# Patient Record
Sex: FEMALE | Race: WHITE | NOT HISPANIC OR LATINO | Employment: FULL TIME | URBAN - METROPOLITAN AREA
[De-identification: names, ages, dates, MRNs, and addresses within clinical notes are randomized per-mention and may not be internally consistent; named-entity substitution may affect disease eponyms.]

---

## 2023-06-15 DIAGNOSIS — M47.816 LUMBAR SPONDYLOSIS: ICD-10-CM

## 2023-06-15 DIAGNOSIS — I10 HYPERTENSION, ESSENTIAL: ICD-10-CM

## 2023-06-15 RX ORDER — GABAPENTIN 300 MG/1
300 CAPSULE ORAL DAILY PRN
COMMUNITY
Start: 2023-01-20 | End: 2023-06-15 | Stop reason: SDUPTHER

## 2023-06-15 RX ORDER — GABAPENTIN 300 MG/1
300 CAPSULE ORAL DAILY PRN
Qty: 90 CAPSULE | Refills: 3 | Status: SHIPPED | OUTPATIENT
Start: 2023-06-15 | End: 2024-05-24 | Stop reason: SDUPTHER

## 2023-06-15 RX ORDER — OLMESARTAN MEDOXOMIL 40 MG/1
40 TABLET ORAL DAILY
Qty: 90 TABLET | Refills: 3 | Status: SHIPPED | OUTPATIENT
Start: 2023-06-15 | End: 2023-11-06 | Stop reason: WASHOUT

## 2023-06-15 RX ORDER — OLMESARTAN MEDOXOMIL 40 MG/1
40 TABLET ORAL DAILY
COMMUNITY
Start: 2023-06-12 | End: 2023-06-15 | Stop reason: SDUPTHER

## 2023-07-13 ENCOUNTER — TELEPHONE (OUTPATIENT)
Dept: PRIMARY CARE | Facility: CLINIC | Age: 63
End: 2023-07-13
Payer: COMMERCIAL

## 2023-07-13 NOTE — TELEPHONE ENCOUNTER
Patient was not keeping track of her bp and 3 days ago she thought she would check it and it was 145/83 and 165/88 and this morning it was 185/88 she is not having any symptoms of any kind just thought since it was fluctuating and a little higher than normal she would check with you and see if there is anything else she should do?

## 2023-07-13 NOTE — TELEPHONE ENCOUNTER
Is she on benicar 40mg and acebutalol 400mg?  No PAMI abstraction has been done for her so I looked at an old note  11/8/21: our BP cuff 134/68 manual hr 72             pt cuff     138/82 hr 71  Would d/c Advil or other NSAIDs as they raise BP  Would use tylenol 1000mg every 8 hours as needed for pain  Max dose tylenol in a day is 3000mg   If no symptoms let' s see what this does for BP also report HR with BP  Schedule f/u next week with NP  Can be VV  May need to adjust meds  Max dose acetbutalol is 800mg daily so room there as long as HRI isnt too low

## 2023-07-13 NOTE — TELEPHONE ENCOUNTER
Patient states her bp has been fluctuating bp readings running in the 145's and 165's. She did have a knee replacement about 6 weeks ago and has been taking a lot of advil not sure if that is related.

## 2023-07-14 RX ORDER — ASCORBIC ACID 500 MG
1 TABLET,CHEWABLE ORAL 2 TIMES DAILY
COMMUNITY
Start: 2018-09-14

## 2023-07-14 RX ORDER — LEVOTHYROXINE SODIUM 112 UG/1
112 TABLET ORAL
COMMUNITY
Start: 2023-07-09 | End: 2024-03-19 | Stop reason: SDUPTHER

## 2023-07-14 RX ORDER — CHOLECALCIFEROL (VITAMIN D3) 25 MCG
1 TABLET ORAL DAILY
COMMUNITY
Start: 2018-08-27

## 2023-07-14 RX ORDER — ACEBUTOLOL HYDROCHLORIDE 200 MG/1
200 CAPSULE ORAL AS NEEDED
COMMUNITY
Start: 2023-06-27 | End: 2023-07-19 | Stop reason: SDUPTHER

## 2023-07-14 NOTE — TELEPHONE ENCOUNTER
Patient is taking Benicar 40mg daily, not the acebutalol daily only as needed.  Patient states the Tylenol does not work with her pain, I did PAMI her chart, patient would like an appt, please call patient to schedule

## 2023-07-18 NOTE — TELEPHONE ENCOUNTER
Patient called and left a voicemail stating to schedule for soonest appointment and leave on voicemail. This has been completed

## 2023-07-19 ENCOUNTER — TELEMEDICINE (OUTPATIENT)
Dept: PRIMARY CARE | Facility: CLINIC | Age: 63
End: 2023-07-19
Payer: COMMERCIAL

## 2023-07-19 VITALS
HEART RATE: 77 BPM | BODY MASS INDEX: 21.82 KG/M2 | DIASTOLIC BLOOD PRESSURE: 96 MMHG | WEIGHT: 144 LBS | SYSTOLIC BLOOD PRESSURE: 154 MMHG | HEIGHT: 68 IN

## 2023-07-19 DIAGNOSIS — M15.9 PRIMARY OSTEOARTHRITIS INVOLVING MULTIPLE JOINTS: ICD-10-CM

## 2023-07-19 DIAGNOSIS — I10 PRIMARY HYPERTENSION: Primary | ICD-10-CM

## 2023-07-19 DIAGNOSIS — I47.10 PAROXYSMAL SUPRAVENTRICULAR TACHYCARDIA (CMS-HCC): ICD-10-CM

## 2023-07-19 PROBLEM — M25.562 CHRONIC PAIN OF BOTH KNEES: Status: ACTIVE | Noted: 2021-05-11

## 2023-07-19 PROBLEM — M51.9 LUMBAR DISC DISEASE: Status: ACTIVE | Noted: 2023-07-19

## 2023-07-19 PROBLEM — R92.8 ABNORMAL MAMMOGRAM: Status: ACTIVE | Noted: 2019-01-08

## 2023-07-19 PROBLEM — D50.9 IRON DEFICIENCY ANEMIA, UNSPECIFIED: Status: ACTIVE | Noted: 2023-07-19

## 2023-07-19 PROBLEM — R05.3 CHRONIC COUGH: Status: ACTIVE | Noted: 2023-07-19

## 2023-07-19 PROBLEM — M15.0 PRIMARY OSTEOARTHRITIS INVOLVING MULTIPLE JOINTS: Status: ACTIVE | Noted: 2023-07-19

## 2023-07-19 PROBLEM — M54.16 LUMBAR RADICULOPATHY: Status: ACTIVE | Noted: 2020-10-20

## 2023-07-19 PROBLEM — Z00.00 ROUTINE HEALTH MAINTENANCE: Status: ACTIVE | Noted: 2023-07-19

## 2023-07-19 PROBLEM — E03.9 HYPOTHYROIDISM, ADULT: Status: ACTIVE | Noted: 2023-07-19

## 2023-07-19 PROBLEM — D64.9 ANEMIA: Status: ACTIVE | Noted: 2023-07-19

## 2023-07-19 PROBLEM — F51.04 CHRONIC INSOMNIA: Status: ACTIVE | Noted: 2023-07-19

## 2023-07-19 PROBLEM — R00.2 PALPITATIONS: Status: ACTIVE | Noted: 2019-01-21

## 2023-07-19 PROBLEM — Z96.651 S/P TOTAL KNEE ARTHROPLASTY, RIGHT: Status: ACTIVE | Noted: 2023-05-19

## 2023-07-19 PROBLEM — G89.29 CHRONIC PAIN OF BOTH KNEES: Status: ACTIVE | Noted: 2021-05-11

## 2023-07-19 PROBLEM — J30.9 ALLERGIC RHINITIS: Status: ACTIVE | Noted: 2023-07-19

## 2023-07-19 PROBLEM — M25.561 CHRONIC PAIN OF BOTH KNEES: Status: ACTIVE | Noted: 2021-05-11

## 2023-07-19 PROBLEM — R94.31 ABNORMAL EKG: Status: ACTIVE | Noted: 2023-07-19

## 2023-07-19 PROBLEM — E53.8 VITAMIN B12 DEFICIENCY: Status: ACTIVE | Noted: 2023-07-19

## 2023-07-19 PROBLEM — K64.0 GRADE I HEMORRHOIDS: Status: ACTIVE | Noted: 2023-07-19

## 2023-07-19 PROBLEM — Z85.850 HISTORY OF THYROID CANCER: Status: ACTIVE | Noted: 2023-07-19

## 2023-07-19 PROBLEM — E55.9 VITAMIN D DEFICIENCY: Status: ACTIVE | Noted: 2023-07-19

## 2023-07-19 PROCEDURE — 99213 OFFICE O/P EST LOW 20 MIN: CPT | Performed by: NURSE PRACTITIONER

## 2023-07-19 RX ORDER — ACEBUTOLOL HYDROCHLORIDE 200 MG/1
200 CAPSULE ORAL AS NEEDED
Qty: 10 CAPSULE | Refills: 0 | Status: SHIPPED | OUTPATIENT
Start: 2023-07-19

## 2023-07-19 ASSESSMENT — PATIENT HEALTH QUESTIONNAIRE - PHQ9
2. FEELING DOWN, DEPRESSED OR HOPELESS: NOT AT ALL
SUM OF ALL RESPONSES TO PHQ9 QUESTIONS 1 AND 2: 0
1. LITTLE INTEREST OR PLEASURE IN DOING THINGS: NOT AT ALL

## 2023-07-19 NOTE — PROGRESS NOTES
An interactive audio and video telecommunication system which permits real time communications between the patient (at the originating site) and provider (at the distant site) was utilized to provide this telehealth service.  Verbal consent was requested and obtained from the patient for this telehealth visit.       Subjective   Patient ID: Isabel Burroughs is a 62 y.o. female who presents for Medication review.    TKR 3 mo ago  Arthritis  Stopped advil on 7/14  Pain is all over joints  Not just knee  Its the thigh, the hip, the leg  Wants to see rheum  Since stopping advil  160/90s  118/75  122/74  142/93  HR 60, 64, 76, 70  Pain worsening HTN?  Advil worsening HTN?  Her research shows the possibility of amlodipine and celebrex        Review of Systems  ROS completely negative except what was mentioned in the HPI.  Problem List, surgical, social, and family histories which were reviewed and updated as necessary within the EMR. I also personally reviewed the notes, labs, and imaging that pertained to what was documented or discussed in the HPI.      Objective   Physical Exam  Vitals and nursing note reviewed.   Constitutional:       Appearance: Normal appearance.   HENT:      Head: Normocephalic and atraumatic.      Right Ear: External ear normal.      Left Ear: External ear normal.      Nose: Nose normal.      Mouth/Throat:      Mouth: Mucous membranes are moist.   Eyes:      Extraocular Movements: Extraocular movements intact.      Conjunctiva/sclera: Conjunctivae normal.   Pulmonary:      Effort: Pulmonary effort is normal.   Musculoskeletal:      Cervical back: Normal range of motion and neck supple.   Neurological:      General: No focal deficit present.      Mental Status: She is alert and oriented to person, place, and time. Mental status is at baseline.   Psychiatric:         Mood and Affect: Mood normal.         Behavior: Behavior normal.         Thought Content: Thought content normal.         Judgment:  "Judgment normal.         BP (!) 154/96   Pulse 77   Ht 1.727 m (5' 8\")   Wt 65.3 kg (144 lb)   BMI 21.90 kg/m²     Assessment/Plan    Problem List Items Addressed This Visit       Paroxysmal supraventricular tachycardia (CMS/HCC)    Overview     Holter 2013: IMPRESSIONS AND FINDINGS:       Sinus rhythm / tachycardia.       Maximum heart rate was 130 bpm at 06:37.       Minimum heart rate was 64 bpm at 09:35.       The average heart rate was 70 bpm.       Rare ventricular ectopic singles.       Rare supraventricular ectopic singles, couplets,       bigeminy, and runs. Th fastest run 4 beats 156 bpm       at13:08 and longest run 13 beats 136 bpm at 17:51.       Patient's diary at 10:58 on 9/11/13 did correlate with       ecg signal with ventricular ectopic singles.       Holter 8/22: NSR, average HR 69 (min 52, max 137)      AFIB/AFLUTTER 0%      SVE: BUrden 0.28% max count/24 hr was 275      SVT: 25 events. longest 14 beats; fastest 170      Pause: ) events      AVB; 0%      PVC 0.01% max/24h= 11      VT: 0 events      Pt recorded no events      s/p ER consult 11/22: 2 soft indications for treatment of her ectopy, namely to decrease      her symptoms and to decrease the risk of future atrial fibrillation.      acetabutol prn prescribed         Current Assessment & Plan     Since thyroid medications adjusted, has not had any episodes  Has BB to use prn, has not needed  Followed by EP cards         Relevant Medications    acebutolol (Sectral) 200 mg capsule    Primary hypertension - Primary    Overview     11/8/21: our BP cuff  134/68  manual  hr 72      pt cuff          138/82 hr 71  12/21 Renal Artery US (-)  On Benicar daily, Acebutatolol prn  Adverse effects w/ACEi, stopped 1/23         Current Assessment & Plan     Discussed HTN tx & OA tx  Will discuss plan with PCP         Relevant Medications    acebutolol (Sectral) 200 mg capsule    Primary osteoarthritis involving multiple joints    Current Assessment & " Plan     Advil contributing to HTN?  She would like to start celebrex  Discussed it can still increase BP, may need to begin HTN tx and titrate up  Will discuss with PCP

## 2023-07-19 NOTE — ASSESSMENT & PLAN NOTE
Since thyroid medications adjusted, has not had any episodes  Has BB to use prn, has not needed  Followed by EP cards

## 2023-07-19 NOTE — ASSESSMENT & PLAN NOTE
Advil contributing to HTN?  She would like to start celebrex  Discussed it can still increase BP, may need to begin HTN tx and titrate up  Will discuss with PCP

## 2023-07-21 DIAGNOSIS — I10 PRIMARY HYPERTENSION: Primary | ICD-10-CM

## 2023-07-21 DIAGNOSIS — M15.9 PRIMARY OSTEOARTHRITIS INVOLVING MULTIPLE JOINTS: ICD-10-CM

## 2023-07-21 RX ORDER — AMLODIPINE BESYLATE 5 MG/1
5 TABLET ORAL DAILY
Qty: 30 TABLET | Refills: 5 | Status: SHIPPED | OUTPATIENT
Start: 2023-07-21 | End: 2023-08-13

## 2023-07-21 RX ORDER — CELECOXIB 200 MG/1
200 CAPSULE ORAL DAILY
Qty: 90 CAPSULE | Refills: 3 | Status: SHIPPED | OUTPATIENT
Start: 2023-07-21 | End: 2023-09-12 | Stop reason: ALTCHOICE

## 2023-07-24 PROBLEM — Z86.19 HISTORY OF HELICOBACTER PYLORI INFECTION: Status: ACTIVE | Noted: 2023-07-24

## 2023-08-12 DIAGNOSIS — I10 PRIMARY HYPERTENSION: ICD-10-CM

## 2023-08-13 RX ORDER — AMLODIPINE BESYLATE 5 MG/1
5 TABLET ORAL DAILY
Qty: 30 TABLET | Refills: 5 | Status: SHIPPED | OUTPATIENT
Start: 2023-08-13 | End: 2024-01-27

## 2023-09-12 RX ORDER — IBUPROFEN 200 MG
2 TABLET ORAL EVERY 6 HOURS PRN
COMMUNITY

## 2023-10-14 ENCOUNTER — LAB (OUTPATIENT)
Dept: LAB | Facility: LAB | Age: 63
End: 2023-10-14
Payer: COMMERCIAL

## 2023-10-14 DIAGNOSIS — I10 PRIMARY HYPERTENSION: ICD-10-CM

## 2023-10-14 LAB
ANION GAP SERPL CALC-SCNC: 10 MMOL/L (ref 10–20)
BUN SERPL-MCNC: 24 MG/DL (ref 6–23)
CALCIUM SERPL-MCNC: 9.4 MG/DL (ref 8.6–10.3)
CHLORIDE SERPL-SCNC: 104 MMOL/L (ref 98–107)
CO2 SERPL-SCNC: 31 MMOL/L (ref 21–32)
CREAT SERPL-MCNC: 0.78 MG/DL (ref 0.5–1.05)
GFR SERPL CREATININE-BSD FRML MDRD: 86 ML/MIN/1.73M*2
GLUCOSE SERPL-MCNC: 84 MG/DL (ref 74–99)
POTASSIUM SERPL-SCNC: 4.4 MMOL/L (ref 3.5–5.3)
SODIUM SERPL-SCNC: 141 MMOL/L (ref 136–145)

## 2023-10-14 PROCEDURE — 80048 BASIC METABOLIC PNL TOTAL CA: CPT

## 2023-10-14 PROCEDURE — 36415 COLL VENOUS BLD VENIPUNCTURE: CPT

## 2023-11-06 ENCOUNTER — TELEMEDICINE (OUTPATIENT)
Dept: PRIMARY CARE | Facility: CLINIC | Age: 63
End: 2023-11-06
Payer: COMMERCIAL

## 2023-11-06 DIAGNOSIS — F41.8 ANXIETY ABOUT HEALTH: Primary | ICD-10-CM

## 2023-11-06 PROBLEM — R92.8 ABNORMAL MAMMOGRAM: Status: RESOLVED | Noted: 2019-01-08 | Resolved: 2023-11-06

## 2023-11-06 PROBLEM — R00.2 PALPITATIONS: Status: RESOLVED | Noted: 2019-01-21 | Resolved: 2023-11-06

## 2023-11-06 PROBLEM — R94.31 ABNORMAL EKG: Status: RESOLVED | Noted: 2023-07-19 | Resolved: 2023-11-06

## 2023-11-06 PROBLEM — Z96.651 S/P TOTAL KNEE ARTHROPLASTY, RIGHT: Status: RESOLVED | Noted: 2023-05-19 | Resolved: 2023-11-06

## 2023-11-06 PROCEDURE — 99213 OFFICE O/P EST LOW 20 MIN: CPT | Performed by: NURSE PRACTITIONER

## 2023-11-06 RX ORDER — ESCITALOPRAM OXALATE 10 MG/1
10 TABLET ORAL DAILY
Qty: 90 TABLET | Refills: 3 | Status: SHIPPED | OUTPATIENT
Start: 2023-11-06 | End: 2024-05-24 | Stop reason: WASHOUT

## 2023-11-06 RX ORDER — ALPRAZOLAM 0.25 MG/1
0.25 TABLET ORAL 3 TIMES DAILY PRN
Qty: 21 TABLET | Refills: 0 | Status: SHIPPED | OUTPATIENT
Start: 2023-11-06 | End: 2024-05-24 | Stop reason: WASHOUT

## 2023-11-06 ASSESSMENT — ANXIETY QUESTIONNAIRES
5. BEING SO RESTLESS THAT IT IS HARD TO SIT STILL: SEVERAL DAYS
2. NOT BEING ABLE TO STOP OR CONTROL WORRYING: SEVERAL DAYS
1. FEELING NERVOUS, ANXIOUS, OR ON EDGE: SEVERAL DAYS
3. WORRYING TOO MUCH ABOUT DIFFERENT THINGS: SEVERAL DAYS
4. TROUBLE RELAXING: SEVERAL DAYS
6. BECOMING EASILY ANNOYED OR IRRITABLE: NOT AT ALL
7. FEELING AFRAID AS IF SOMETHING AWFUL MIGHT HAPPEN: SEVERAL DAYS
IF YOU CHECKED OFF ANY PROBLEMS ON THIS QUESTIONNAIRE, HOW DIFFICULT HAVE THESE PROBLEMS MADE IT FOR YOU TO DO YOUR WORK, TAKE CARE OF THINGS AT HOME, OR GET ALONG WITH OTHER PEOPLE: SOMEWHAT DIFFICULT
GAD7 TOTAL SCORE: 6

## 2023-11-06 ASSESSMENT — PATIENT HEALTH QUESTIONNAIRE - PHQ9
1. LITTLE INTEREST OR PLEASURE IN DOING THINGS: SEVERAL DAYS
SUM OF ALL RESPONSES TO PHQ QUESTIONS 1-9: 6
7. TROUBLE CONCENTRATING ON THINGS, SUCH AS READING THE NEWSPAPER OR WATCHING TELEVISION: SEVERAL DAYS
9. THOUGHTS THAT YOU WOULD BE BETTER OFF DEAD, OR OF HURTING YOURSELF: NOT AT ALL
5. POOR APPETITE OR OVEREATING: SEVERAL DAYS
3. TROUBLE FALLING OR STAYING ASLEEP: SEVERAL DAYS
SUM OF ALL RESPONSES TO PHQ9 QUESTIONS 1 & 2: 2
6. FEELING BAD ABOUT YOURSELF - OR THAT YOU ARE A FAILURE OR HAVE LET YOURSELF OR YOUR FAMILY DOWN: NOT AT ALL
2. FEELING DOWN, DEPRESSED OR HOPELESS: SEVERAL DAYS
8. MOVING OR SPEAKING SO SLOWLY THAT OTHER PEOPLE COULD HAVE NOTICED. OR THE OPPOSITE, BEING SO FIGETY OR RESTLESS THAT YOU HAVE BEEN MOVING AROUND A LOT MORE THAN USUAL: NOT AT ALL
10. IF YOU CHECKED OFF ANY PROBLEMS, HOW DIFFICULT HAVE THESE PROBLEMS MADE IT FOR YOU TO DO YOUR WORK, TAKE CARE OF THINGS AT HOME, OR GET ALONG WITH OTHER PEOPLE: SOMEWHAT DIFFICULT
4. FEELING TIRED OR HAVING LITTLE ENERGY: SEVERAL DAYS

## 2023-11-06 NOTE — PROGRESS NOTES
Due to technical difficulties on the parties end, an interactive audio only telecommunication system which permits real time communications between the patient (at the originating site) and provider (at the distant site) was utilized to provide this telehealth service.  Verbal consent was requested and obtained from the patient for this telehealth visit.     Subjective   Patient ID: Isabel Burroughs is a 62 y.o. female who presents for Anxiety.    Anxiety  Was in a study for [facility]  Theres possible areas of cancer in liver or bile duct  Did CT  Friday results, referral to hepatology  See a mass  Next step is to hear from liver team for biopsy of liver  Having heightened anxiety  Would like sometime to immediate bring down anxiety  Not opposed to long term medication for anxiety        Review of Systems  ROS completely negative except what was mentioned in the HPI.  Problem List, surgical, social, and family histories which were reviewed and updated as necessary within the EMR. I also personally reviewed the notes, labs, and imaging that pertained to what was documented or discussed in the HPI.    Objective   Physical Exam  Pulmonary:      Effort: Pulmonary effort is normal.      Comments: Speaking in complete sentences  Neurological:      Mental Status: She is oriented to person, place, and time. Mental status is at baseline.   Psychiatric:         Thought Content: Thought content normal.         Judgment: Judgment normal.       There were no vitals taken for this visit.    Assessment/Plan    Problem List Items Addressed This Visit       Anxiety about health - Primary    Overview     11/6/23 - PHQ = 6; MIGUEL = 6  Started lexapro, prn xanax         Relevant Medications    escitalopram (Lexapro) 10 mg tablet    ALPRAZolam (Xanax) 0.25 mg tablet

## 2023-11-07 PROBLEM — F41.8 ANXIETY ABOUT HEALTH: Status: ACTIVE | Noted: 2023-11-07

## 2023-11-29 PROBLEM — C22.1 CHOLANGIOCARCINOMA (MULTI): Status: ACTIVE | Noted: 2023-11-29

## 2023-12-18 ENCOUNTER — APPOINTMENT (OUTPATIENT)
Dept: PRIMARY CARE | Facility: CLINIC | Age: 63
End: 2023-12-18
Payer: COMMERCIAL

## 2024-01-02 ENCOUNTER — DOCUMENTATION (OUTPATIENT)
Dept: PRIMARY CARE | Facility: CLINIC | Age: 64
End: 2024-01-02
Payer: COMMERCIAL

## 2024-01-03 ENCOUNTER — PATIENT OUTREACH (OUTPATIENT)
Dept: CARE COORDINATION | Facility: CLINIC | Age: 64
End: 2024-01-03
Payer: COMMERCIAL

## 2024-01-03 RX ORDER — METHOCARBAMOL 500 MG/1
250 TABLET, FILM COATED ORAL 2 TIMES DAILY PRN
COMMUNITY
End: 2024-05-24 | Stop reason: WASHOUT

## 2024-01-03 RX ORDER — OXYCODONE HYDROCHLORIDE 5 MG/1
2.5 TABLET ORAL EVERY 6 HOURS PRN
COMMUNITY
End: 2024-03-19 | Stop reason: ALTCHOICE

## 2024-01-03 RX ORDER — ONDANSETRON 4 MG/1
4 TABLET, FILM COATED ORAL EVERY 8 HOURS PRN
COMMUNITY
End: 2024-03-19 | Stop reason: ALTCHOICE

## 2024-01-03 RX ORDER — ENOXAPARIN SODIUM 100 MG/ML
40 INJECTION SUBCUTANEOUS DAILY
COMMUNITY
End: 2024-03-19 | Stop reason: ALTCHOICE

## 2024-01-03 NOTE — PROGRESS NOTES
Discharge Facility:[facility]  Discharge Diagnosis:Cholangiocarcinoma   Admission Date:12/15/2023  Discharge Date: 12/30/2023    PCP Appointment Date:Declined, Will fu with Surgeon, Hem Onc  Specialist Appointment Date: 1/11/2024 Hem/Onc [facility] Encounter and Summary:  not available at this time   See discharge assessment below for further details  Engagement  Call Start Time: 1500 (1/3/2024  3:03 PM)    Medications  Medications reviewed with patient/caregiver?: -- (Oxycodone IR 5 mg 1/2 q 6 hours prn pain, Zofran 4mg one q 8 hours prn, Robaxin 500 mg 1/2 bid, Lovenox 40mg/0.4 ml 0.4 ml sq qd x 28 days.) (1/3/2024  3:03 PM)  Does the patient have all medications ordered at discharge?: Yes (1/3/2024  3:03 PM)  Care Management Interventions: No intervention needed (1/3/2024  3:03 PM)  Is the patient taking all medications as directed (includes completed medication regime)?: Yes (1/3/2024  3:03 PM)    Appointments  Does the patient have a primary care provider?: Yes (1/3/2024  3:03 PM)  Care Management Interventions: -- (Declines PCP follow up, will fu with Surgeon, Hem/Onc) (1/3/2024  3:03 PM)  Has the patient kept scheduled appointments due by today?: Yes (1/3/2024  3:03 PM)    Self Management  What is the home health agency?: -- ([facility] has been ordered, she has not been contacted yet) (1/3/2024  3:03 PM)  Has home health visited the patient within 72 hours of discharge?: Call prior to 72 hours (1/3/2024  3:03 PM)    Patient Teaching  Does the patient have access to their discharge instructions?: Yes (1/3/2024  3:03 PM)  What is the patient's perception of their health status since discharge?: Improving (1/3/2024  3:03 PM)  Is the patient/caregiver able to teach back the hierarchy of who to call/visit for symptoms/problems? PCP, Specialist, Home Health nurse, Urgent Care, ED, 911: Yes (1/3/2024  3:03 PM)    Wrap Up  Wrap Up Additional Comments: -- (Isabel states the incision site is doing well, she has 2 drains  which are clear. She will fu with Surgeon and Hem/Onc) (1/3/2024  3:03 PM)

## 2024-01-27 DIAGNOSIS — I10 PRIMARY HYPERTENSION: ICD-10-CM

## 2024-01-27 RX ORDER — AMLODIPINE BESYLATE 5 MG/1
5 TABLET ORAL DAILY
Qty: 30 TABLET | Refills: 5 | Status: SHIPPED | OUTPATIENT
Start: 2024-01-27 | End: 2024-03-19 | Stop reason: WASHOUT

## 2024-02-16 ENCOUNTER — PATIENT OUTREACH (OUTPATIENT)
Dept: CARE COORDINATION | Facility: CLINIC | Age: 64
End: 2024-02-16
Payer: COMMERCIAL

## 2024-02-16 NOTE — PROGRESS NOTES
Discharge Facility:[facility]  Discharge Diagnosis:Pleural effusion associated with hepatic disorder   Admission Date:2/10/2024  Discharge Date: 2/15/2024    PCP Appointment Date:TBD  Specialist Appointment Date: 2/22/2024 Surgeon/[facility]  2/23/2024 Thoracic Surgeon/[facility] Encounter and Summary:  not available at this time

## 2024-02-19 ENCOUNTER — TELEPHONE (OUTPATIENT)
Dept: PRIMARY CARE | Facility: CLINIC | Age: 64
End: 2024-02-19
Payer: COMMERCIAL

## 2024-02-19 NOTE — TELEPHONE ENCOUNTER
----- Message from Vashti Carpenter MD sent at 2/16/2024  5:36 PM EST -----  Regarding: FW: TCM No Answer  Can offer f/up appt with NP if she wishes  ----- Message -----  From: Dannielle Hernandez MA  Sent: 2/16/2024   5:33 PM EST  To: #  Subject: TCM No Answer                                    This patient was discharged from: [facility]  Discharge diagnosis:  Pleural Effusion associated w/hepatic disorder    Date of discharge: 2/15/2024       No PCP appointments available within 14 days of discharge. Unable to contact patient despite outreach attempts x 2 made.  Please reach out to patient and schedule an appointment by: 2/29/2024    Thank You!

## 2024-02-24 PROBLEM — J90 PLEURAL EFFUSION: Status: ACTIVE | Noted: 2024-02-24

## 2024-02-29 DIAGNOSIS — E03.9 HYPOTHYROIDISM, ADULT: ICD-10-CM

## 2024-03-01 ENCOUNTER — PATIENT OUTREACH (OUTPATIENT)
Dept: PRIMARY CARE | Facility: CLINIC | Age: 64
End: 2024-03-01
Payer: COMMERCIAL

## 2024-03-01 NOTE — PROGRESS NOTES
Unable to reach patient for call back after patient's follow up appointment with PCP.   LUCIUSM with call back number for patient to call if needed   If no voicemail available call attempts x 2 were made to contact the patient to assist with any questions or concerns patient may have.

## 2024-03-14 ENCOUNTER — LAB (OUTPATIENT)
Dept: LAB | Facility: LAB | Age: 64
End: 2024-03-14
Payer: COMMERCIAL

## 2024-03-14 DIAGNOSIS — E03.9 HYPOTHYROIDISM, ADULT: ICD-10-CM

## 2024-03-14 DIAGNOSIS — R53.83 FATIGUE, UNSPECIFIED TYPE: ICD-10-CM

## 2024-03-14 LAB
T4 FREE SERPL-MCNC: 1.02 NG/DL (ref 0.78–1.48)
TSH SERPL-ACNC: 9.12 MIU/L (ref 0.44–3.98)

## 2024-03-14 PROCEDURE — 82306 VITAMIN D 25 HYDROXY: CPT

## 2024-03-14 PROCEDURE — 83540 ASSAY OF IRON: CPT

## 2024-03-14 PROCEDURE — 84443 ASSAY THYROID STIM HORMONE: CPT

## 2024-03-14 PROCEDURE — 83550 IRON BINDING TEST: CPT

## 2024-03-14 PROCEDURE — 84439 ASSAY OF FREE THYROXINE: CPT

## 2024-03-14 PROCEDURE — 84100 ASSAY OF PHOSPHORUS: CPT

## 2024-03-14 PROCEDURE — 84132 ASSAY OF SERUM POTASSIUM: CPT

## 2024-03-14 PROCEDURE — 36415 COLL VENOUS BLD VENIPUNCTURE: CPT

## 2024-03-15 ENCOUNTER — TELEPHONE (OUTPATIENT)
Dept: PRIMARY CARE | Facility: CLINIC | Age: 64
End: 2024-03-15
Payer: COMMERCIAL

## 2024-03-15 NOTE — TELEPHONE ENCOUNTER
----- Message from aVshti Carpenter MD sent at 3/15/2024  9:42 AM EDT -----  Schedule and we can address thyroid treatment then  Can do VV earlier than CPE if need be for that  ----- Message -----  From: Bernie Diehl MA  Sent: 3/15/2024   9:24 AM EDT  To: Vashti Carpenter MD    Relayed to pt verbally understood and acknowledged   Pt does have sx of fatigue, dry skin always cold so that has never changed   Will have clerical reach out to schedule CPE     (Overdue for CPE schedule  Allow 40min so have her come earlier to be roomed if given a 30min slot)

## 2024-03-15 NOTE — TELEPHONE ENCOUNTER
I am only likely going to increase it by a little bit so she can take an extra 1/2 pill once a week

## 2024-03-15 NOTE — TELEPHONE ENCOUNTER
Patient scheduled virtual appt on 3/19 for thyroid.  Questioning if there should be any changes in her medication before her appt.

## 2024-03-19 ENCOUNTER — TELEMEDICINE (OUTPATIENT)
Dept: PRIMARY CARE | Facility: CLINIC | Age: 64
End: 2024-03-19
Payer: COMMERCIAL

## 2024-03-19 VITALS — DIASTOLIC BLOOD PRESSURE: 70 MMHG | SYSTOLIC BLOOD PRESSURE: 124 MMHG | BODY MASS INDEX: 18.55 KG/M2 | WEIGHT: 122 LBS

## 2024-03-19 DIAGNOSIS — F41.8 ANXIETY ABOUT HEALTH: ICD-10-CM

## 2024-03-19 DIAGNOSIS — Z85.850 HISTORY OF THYROID CANCER: ICD-10-CM

## 2024-03-19 DIAGNOSIS — D64.9 ANEMIA, UNSPECIFIED TYPE: ICD-10-CM

## 2024-03-19 DIAGNOSIS — R53.83 FATIGUE, UNSPECIFIED TYPE: Primary | ICD-10-CM

## 2024-03-19 DIAGNOSIS — D50.9 IRON DEFICIENCY ANEMIA, UNSPECIFIED IRON DEFICIENCY ANEMIA TYPE: ICD-10-CM

## 2024-03-19 DIAGNOSIS — E53.8 VITAMIN B12 DEFICIENCY: ICD-10-CM

## 2024-03-19 DIAGNOSIS — E03.9 HYPOTHYROIDISM, ADULT: ICD-10-CM

## 2024-03-19 DIAGNOSIS — E55.9 VITAMIN D DEFICIENCY: ICD-10-CM

## 2024-03-19 DIAGNOSIS — C22.1 CHOLANGIOCARCINOMA (MULTI): ICD-10-CM

## 2024-03-19 PROBLEM — G89.29 CHRONIC PAIN OF BOTH KNEES: Status: RESOLVED | Noted: 2021-05-11 | Resolved: 2024-03-19

## 2024-03-19 PROBLEM — M25.561 CHRONIC PAIN OF BOTH KNEES: Status: RESOLVED | Noted: 2021-05-11 | Resolved: 2024-03-19

## 2024-03-19 PROBLEM — M25.562 CHRONIC PAIN OF BOTH KNEES: Status: RESOLVED | Noted: 2021-05-11 | Resolved: 2024-03-19

## 2024-03-19 LAB
25(OH)D3 SERPL-MCNC: 50 NG/ML (ref 30–100)
IRON SATN MFR SERPL: 14 % (ref 25–45)
IRON SERPL-MCNC: 29 UG/DL (ref 35–150)
PHOSPHATE SERPL-MCNC: 3.4 MG/DL (ref 2.5–4.9)
POTASSIUM SERPL-SCNC: 4.7 MMOL/L (ref 3.5–5.3)
TIBC SERPL-MCNC: 213 UG/DL (ref 240–445)
UIBC SERPL-MCNC: 184 UG/DL (ref 110–370)

## 2024-03-19 PROCEDURE — 3074F SYST BP LT 130 MM HG: CPT | Performed by: INTERNAL MEDICINE

## 2024-03-19 PROCEDURE — 3078F DIAST BP <80 MM HG: CPT | Performed by: INTERNAL MEDICINE

## 2024-03-19 PROCEDURE — 1036F TOBACCO NON-USER: CPT | Performed by: INTERNAL MEDICINE

## 2024-03-19 PROCEDURE — 99215 OFFICE O/P EST HI 40 MIN: CPT | Performed by: INTERNAL MEDICINE

## 2024-03-19 RX ORDER — LEVOTHYROXINE SODIUM 112 UG/1
TABLET ORAL
Qty: 104 TABLET | Refills: 3
Start: 2024-03-19 | End: 2024-04-09 | Stop reason: SDUPTHER

## 2024-03-19 NOTE — PROGRESS NOTES
An interactive telecommunication system which permits real time communications between the patient (at the originating site) and provider (at the distant site) was utilized to provide this telehealth service.    Verbal consent was requested and obtained from the patient for this telehealth visit.    We have confirmed the patient wishes to see me, Dr. Vashti Carpenter, a board certified Internal Medicine physician with an active [place] medical license as well as verified the patient's identity and physical location in [place].  Audio and Visual both.    Chief Complaint/HPI:  Chief Complaint   Patient presents with    Thyroid Problem     Pt does have sx of fatigue, dry skin always has been cold so that has never changed  Had surgery a couple months ago -Liver resection    Recent labs done that showed elevated TSH    Had multiple complications following her liver cancer surgery, surgical resection which took place on Dec 15   Intrahepatic cholangiocarcinoma (HCC)  Staging form: Intrahepatic Bile Duct, AJCC 8th Edition  - Pathologic stage from 12/15/2023: Stage IB (pT1b, pN0, cM0)  An MRI of the liver followed and CT scan of the chest showed no evidence of metastatic disease.  On abdominal imaging there is a suspicious area of possible peritoneal disease in the right lower quadrant   Had a bile duct leak, post-op biloma       Has recurrent pleaural effusion, unclear cause  Pleurex placed with IR 2/11. Went to US and underwent a paracentesis 2/15 where 1.4 L of fluid was removed.   Hasn't started the chemo: per chart notes it will beg capecitabine 2000/1500 mg 2 weeks on/1 week off. for 8 cycles. But given DPYD intermediate metabolizer, will start with 1000 mg BID.     She feels she has turned the corner after her bile drain was removed    She called on and was tired   On gabapentin at night  On Lexapro  Off BP meds  Had black BM that was black  Did take pepto bismol  Last CBC reviewed 2/15/24  Hemoglobin  11.5 - 15.5 g/dL 7.7  Low    Hematocrit  36.0 - 46.0 % 23.3 Low        Labs reviewed:  Component      Latest Ref Rng 3/14/2024   Thyroid Stimulating Hormone      0.44 - 3.98 mIU/L 9.12 (H)    Thyroxine, Free      0.78 - 1.48 ng/dL 1.02         ASSESSMENT/PLAN  Problem List Items Addressed This Visit          Medium    Anemia    Overview     Hematology consulted in January 2020.      Normocytic anemia for many years and has been relatively stable.        No other cytopenias noted and is noted to have a normal differential of her white blood      count.       Prior iron, folate studies have been normal. Hgb identification was normal      Haptoglobin normal, no evidence of hemolysis.      Given her Hgb has been stable for years and normal today, this could be her      normal      If Hgb starts to drift down at any point in the future or less than 10, that is when I      would evaluate with a bone marrow biopsy       On B12 supplement (level was low at 236 in 10/20), parenteral x 3months, switched to      oral 1/21      Hb 11.3 in 2015      Monitor         Relevant Orders    CBC and Auto Differential    Anxiety about health    Overview     11/6/23 - PHQ = 6; MIGUEL = 6  Started lexapro, prn xanax         Current Assessment & Plan     Try and wean down and off         Cholangiocarcinoma (CMS/HCC)    Overview     Stage IB (pT1b, pN0, cM0)   Incidentally found 5.8cm intrahepatic cholangiocarcinoma, discovered during workup for elevated tumor markers after enrolling in a clinical trial at [facility].   Had positive screen noted on her Grail test for: Liver, Bile duct cancer and Pancreas, Gallbladder cancer  The mass involves/abuts the intrahepatic IVC, main PV, as well as the middle and left hepatic veins.   CT liver 11/23: 5.7 CM LOBULAR ENHANCING MASS IN THE CENTRAL ASPECT OF THE LIVER WITH   SUSPECTED VASCULAR INVOLVEMENT AND ABDOMINAL LYMPHADENOPATHY.  PRIMARY   HEPATIC LESION CONSIDERED MOST LIKELY WITH LEADING DIFFERENTIAL   CONSIDERATION OF  CHOLANGIOCARCINOMA.   SUBTLE HAZY MESENTERIC DENSITY IN THE PERIPHERY OF THE ABDOMEN SUGGESTIVE   OF DEVELOPING CARCINOMATOSIS.   MRI liver 11/23: Central hepatic mass, presumably a cholangiocarcinoma.  This has vascular and biliary abutment   Liver biopsy 11/23: Adenocarcinoma involving liver parenchyma. . The overall immunophenotype would support a metastatic adenocarcinoma of pancreaticobiliary origin or a primary intrahepatic cholangiocarcinoma   12/23 PET SCAN: 5.7 cm central hepatic moderately avid cholangiocarcinoma  *  No FDG avid lymphadenopathy   S/p diagnostic laparoscopy converted to exploratory laparotomy, extended left hepatectomy, intraoperative ultrasound, and hilar lympadenectomy (12/15/2023)   Recommending capecitabine 2000/1500 mg 2 weeks on/1 week off. for 8 cycles,  given DPYD intermediate metabolizer, will start with 1000 mg BID   Complicated by recurrent post-op pleural effusion  s/p IR placement of R pleurx catheter 2/11/24.   Sees Oncology [facility]           History of thyroid cancer    Overview     Occult papillary Carcinoma;mutifocal micropapillary thyroid cancer.   Follicular Adenoma;mutifocal micropapillary thyroid cancer.  Keep TSH low, no need for complete suppression though         Hypothyroidism, adult    Overview     Goal TSH low, but doesnt have to be undetectable      On synthroid         Current Assessment & Plan     Incraese dose by one extra pill/week  Labs in 6 weeks         Relevant Medications    levothyroxine (Synthroid, Levoxyl) 112 mcg tablet    Other Relevant Orders    Tsh With Reflex To Free T4 If Abnormal    T3, free    T3    Iron deficiency anemia, unspecified    Overview     Last Assessment & Plan: Formatting of this note might be different from the original. Assessment: chronic and stable, CBC 11/11/2022: 11.9 -pre op CBC pending         Vitamin B12 deficiency    Overview     B12 236 in 10/20      Started B12 supplements, parenteral x 3months then changed to oral in  1/21      Monitor         Vitamin D deficiency    Overview     Goal 40-50      On supplement      Monitor          Other Visit Diagnoses       Fatigue, unspecified type    -  Primary    Suspect due to anemia  Check additional labs  Wean off SSRI if able  Increase thyroid med dose    Relevant Orders    Phosphorus    Vitamin B12    Vitamin D 25-Hydroxy,Total (for eval of Vitamin D levels)    Hepatic function panel    Iron and TIBC    Potassium          Time spent prepping/preparing for visit as well as pre/post-visit charting: 10 minutes  Time spent directly with Patient: 30 minutes  Total Time: 40 minutes      ALLERGIES  Albuterol, Amitriptyline, and Levofloxacin    MEDICATIONS  Current Outpatient Medications   Medication Instructions    acebutolol (SECTRAL) 200 mg, oral, As needed, Takes only when having rapid heart beat per cardiology    ALPRAZolam (XANAX) 0.25 mg, oral, 3 times daily PRN    ascorbic acid (Vitamin C) 500 mg chewable tablet 1 tablet, oral, 2 times daily    cholecalciferol (Vitamin D-3) 25 MCG (1000 UT) tablet 1 tablet, oral, Daily    escitalopram (LEXAPRO) 10 mg, oral, Daily    gabapentin (NEURONTIN) 300 mg, oral, Daily PRN    ibuprofen (AdviL) 200 mg tablet 2 tablets, oral, Every 6 hours PRN    levothyroxine (Synthroid, Levoxyl) 112 mcg tablet Take one pill daily and an extra pill once weekly    methocarbamol (ROBAXIN) 250 mg, oral, 2 times daily PRN        ROS otherwise negative aside from what was mentioned above in HPI.    PHYSICAL EXAM  Gen: Alert, NAD

## 2024-04-09 DIAGNOSIS — E03.9 HYPOTHYROIDISM, ADULT: ICD-10-CM

## 2024-04-09 RX ORDER — LEVOTHYROXINE SODIUM 112 UG/1
TABLET ORAL
Qty: 104 TABLET | Refills: 3 | Status: SHIPPED | OUTPATIENT
Start: 2024-04-09

## 2024-05-08 ENCOUNTER — LAB (OUTPATIENT)
Dept: LAB | Facility: LAB | Age: 64
End: 2024-05-08
Payer: COMMERCIAL

## 2024-05-08 ENCOUNTER — HOSPITAL ENCOUNTER (OUTPATIENT)
Dept: RADIOLOGY | Facility: CLINIC | Age: 64
Discharge: HOME | End: 2024-05-08
Payer: COMMERCIAL

## 2024-05-08 VITALS — BODY MASS INDEX: 19.4 KG/M2 | HEIGHT: 68 IN | WEIGHT: 128 LBS

## 2024-05-08 DIAGNOSIS — Z12.31 ENCOUNTER FOR SCREENING MAMMOGRAM FOR BREAST CANCER: ICD-10-CM

## 2024-05-08 DIAGNOSIS — E03.9 HYPOTHYROIDISM, ADULT: ICD-10-CM

## 2024-05-08 DIAGNOSIS — R53.83 FATIGUE, UNSPECIFIED TYPE: ICD-10-CM

## 2024-05-08 DIAGNOSIS — D64.9 ANEMIA, UNSPECIFIED TYPE: ICD-10-CM

## 2024-05-08 LAB
ALBUMIN SERPL BCP-MCNC: 3 G/DL (ref 3.4–5)
ALP SERPL-CCNC: 191 U/L (ref 33–136)
ALT SERPL W P-5'-P-CCNC: 54 U/L (ref 7–45)
AST SERPL W P-5'-P-CCNC: 43 U/L (ref 9–39)
BASOPHILS # BLD MANUAL: 0 X10*3/UL (ref 0–0.1)
BASOPHILS NFR BLD MANUAL: 0 %
BILIRUB DIRECT SERPL-MCNC: 0.3 MG/DL (ref 0–0.3)
BILIRUB SERPL-MCNC: 1.4 MG/DL (ref 0–1.2)
EOSINOPHIL # BLD MANUAL: 0.15 X10*3/UL (ref 0–0.7)
EOSINOPHIL NFR BLD MANUAL: 1.8 %
ERYTHROCYTE [DISTWIDTH] IN BLOOD BY AUTOMATED COUNT: ABNORMAL %
FERRITIN SERPL-MCNC: 157 NG/ML (ref 8–150)
HCT VFR BLD AUTO: 26.5 % (ref 36–46)
HGB BLD-MCNC: 8.7 G/DL (ref 12–16)
IMM GRANULOCYTES # BLD AUTO: 0.03 X10*3/UL (ref 0–0.7)
IMM GRANULOCYTES NFR BLD AUTO: 0.3 % (ref 0–0.9)
IRON SATN MFR SERPL: 11 % (ref 25–45)
IRON SERPL-MCNC: 32 UG/DL (ref 35–150)
LYMPHOCYTES # BLD MANUAL: 1.2 X10*3/UL (ref 1.2–4.8)
LYMPHOCYTES NFR BLD MANUAL: 13.9 %
MCH RBC QN AUTO: 33.5 PG (ref 26–34)
MCHC RBC AUTO-ENTMCNC: 32.8 G/DL (ref 32–36)
MCV RBC AUTO: 102 FL (ref 80–100)
MONOCYTES # BLD MANUAL: 0.45 X10*3/UL (ref 0.1–1)
MONOCYTES NFR BLD MANUAL: 5.2 %
NEUTS SEG # BLD MANUAL: 6.8 X10*3/UL (ref 1.2–7)
NEUTS SEG NFR BLD MANUAL: 79.1 %
NRBC BLD-RTO: 0 /100 WBCS (ref 0–0)
PLATELET # BLD AUTO: 337 X10*3/UL (ref 150–450)
PLATELET CLUMP BLD QL SMEAR: PRESENT
PROT SERPL-MCNC: 5.1 G/DL (ref 6.4–8.2)
RBC # BLD AUTO: 2.6 X10*6/UL (ref 4–5.2)
RBC MORPH BLD: NORMAL
T3 SERPL-MCNC: 77 NG/DL (ref 60–200)
T3FREE SERPL-MCNC: 2.2 PG/ML (ref 2.3–4.2)
T4 FREE SERPL-MCNC: 1.31 NG/DL (ref 0.78–1.48)
TIBC SERPL-MCNC: 298 UG/DL (ref 240–445)
TOTAL CELLS COUNTED BLD: 115
TSH SERPL-ACNC: 5.49 MIU/L (ref 0.44–3.98)
UIBC SERPL-MCNC: 266 UG/DL (ref 110–370)
VIT B12 SERPL-MCNC: 435 PG/ML (ref 211–911)
WBC # BLD AUTO: 8.6 X10*3/UL (ref 4.4–11.3)

## 2024-05-08 PROCEDURE — 84480 ASSAY TRIIODOTHYRONINE (T3): CPT

## 2024-05-08 PROCEDURE — 82728 ASSAY OF FERRITIN: CPT

## 2024-05-08 PROCEDURE — 82607 VITAMIN B-12: CPT

## 2024-05-08 PROCEDURE — 77067 SCR MAMMO BI INCL CAD: CPT

## 2024-05-08 PROCEDURE — 80076 HEPATIC FUNCTION PANEL: CPT

## 2024-05-08 PROCEDURE — 84443 ASSAY THYROID STIM HORMONE: CPT

## 2024-05-08 PROCEDURE — 85027 COMPLETE CBC AUTOMATED: CPT

## 2024-05-08 PROCEDURE — 84481 FREE ASSAY (FT-3): CPT

## 2024-05-08 PROCEDURE — 84439 ASSAY OF FREE THYROXINE: CPT

## 2024-05-08 PROCEDURE — 83550 IRON BINDING TEST: CPT

## 2024-05-08 PROCEDURE — 83540 ASSAY OF IRON: CPT

## 2024-05-08 PROCEDURE — 36415 COLL VENOUS BLD VENIPUNCTURE: CPT

## 2024-05-08 PROCEDURE — 85007 BL SMEAR W/DIFF WBC COUNT: CPT

## 2024-05-08 PROCEDURE — 77063 BREAST TOMOSYNTHESIS BI: CPT | Performed by: RADIOLOGY

## 2024-05-08 PROCEDURE — 77067 SCR MAMMO BI INCL CAD: CPT | Performed by: RADIOLOGY

## 2024-05-09 ENCOUNTER — HOSPITAL ENCOUNTER (OUTPATIENT)
Dept: RADIOLOGY | Facility: EXTERNAL LOCATION | Age: 64
Discharge: HOME | End: 2024-05-09
Payer: COMMERCIAL

## 2024-05-09 DIAGNOSIS — Z85.850 HISTORY OF THYROID CANCER: ICD-10-CM

## 2024-05-09 DIAGNOSIS — E03.9 HYPOTHYROIDISM, ADULT: ICD-10-CM

## 2024-05-22 ASSESSMENT — PROMIS GLOBAL HEALTH SCALE
EMOTIONAL_PROBLEMS: SOMETIMES
RATE_AVERAGE_PAIN: 2
CARRYOUT_PHYSICAL_ACTIVITIES: A LITTLE
RATE_QUALITY_OF_LIFE: FAIR
RATE_SOCIAL_SATISFACTION: FAIR
RATE_GENERAL_HEALTH: GOOD
RATE_MENTAL_HEALTH: GOOD
RATE_AVERAGE_FATIGUE: MODERATE
RATE_PHYSICAL_HEALTH: FAIR
CARRYOUT_SOCIAL_ACTIVITIES: GOOD

## 2024-05-23 RX ORDER — ACETAMINOPHEN 500 MG
500 TABLET ORAL 4 TIMES DAILY
COMMUNITY
Start: 2023-12-30

## 2024-05-23 RX ORDER — CYCLOBENZAPRINE HCL 5 MG
5 TABLET ORAL DAILY PRN
COMMUNITY
Start: 2024-02-23 | End: 2024-05-24 | Stop reason: WASHOUT

## 2024-05-23 RX ORDER — FUROSEMIDE 20 MG/1
20 TABLET ORAL DAILY PRN
COMMUNITY
Start: 2024-05-02

## 2024-05-23 RX ORDER — AMMONIUM LACTATE 12 G/100G
1 LOTION TOPICAL DAILY PRN
COMMUNITY
Start: 2023-10-19 | End: 2024-05-24 | Stop reason: WASHOUT

## 2024-05-23 RX ORDER — FAMOTIDINE 20 MG/1
20 TABLET, FILM COATED ORAL DAILY
COMMUNITY
Start: 2023-12-30 | End: 2024-05-24 | Stop reason: WASHOUT

## 2024-05-23 RX ORDER — AMOXICILLIN 500 MG/1
2000 CAPSULE ORAL
COMMUNITY
Start: 2023-01-09

## 2024-05-23 RX ORDER — LIDOCAINE 560 MG/1
1 PATCH PERCUTANEOUS; TOPICAL; TRANSDERMAL DAILY
COMMUNITY
Start: 2024-01-23 | End: 2024-05-24 | Stop reason: WASHOUT

## 2024-05-23 RX ORDER — ONDANSETRON HYDROCHLORIDE 8 MG/1
8 TABLET, FILM COATED ORAL EVERY 8 HOURS PRN
COMMUNITY
Start: 2024-03-21

## 2024-05-24 ENCOUNTER — OFFICE VISIT (OUTPATIENT)
Dept: PRIMARY CARE | Facility: CLINIC | Age: 64
End: 2024-05-24
Payer: COMMERCIAL

## 2024-05-24 VITALS
DIASTOLIC BLOOD PRESSURE: 71 MMHG | TEMPERATURE: 96.6 F | BODY MASS INDEX: 18.66 KG/M2 | HEIGHT: 68 IN | WEIGHT: 123.13 LBS | SYSTOLIC BLOOD PRESSURE: 115 MMHG | OXYGEN SATURATION: 100 % | HEART RATE: 84 BPM

## 2024-05-24 DIAGNOSIS — J90 PLEURAL EFFUSION: ICD-10-CM

## 2024-05-24 DIAGNOSIS — G62.0 NEUROPATHY DUE TO CHEMOTHERAPEUTIC DRUG (MULTI): ICD-10-CM

## 2024-05-24 DIAGNOSIS — D64.9 ANEMIA, UNSPECIFIED TYPE: ICD-10-CM

## 2024-05-24 DIAGNOSIS — M47.816 LUMBAR SPONDYLOSIS: ICD-10-CM

## 2024-05-24 DIAGNOSIS — Z12.31 ENCOUNTER FOR SCREENING MAMMOGRAM FOR BREAST CANCER: ICD-10-CM

## 2024-05-24 DIAGNOSIS — I47.10 PAROXYSMAL SUPRAVENTRICULAR TACHYCARDIA (CMS-HCC): ICD-10-CM

## 2024-05-24 DIAGNOSIS — E53.8 VITAMIN B12 DEFICIENCY: ICD-10-CM

## 2024-05-24 DIAGNOSIS — F51.04 CHRONIC INSOMNIA: ICD-10-CM

## 2024-05-24 DIAGNOSIS — Z78.0 MENOPAUSE: ICD-10-CM

## 2024-05-24 DIAGNOSIS — Z85.850 HISTORY OF THYROID CANCER: ICD-10-CM

## 2024-05-24 DIAGNOSIS — E55.9 VITAMIN D DEFICIENCY: ICD-10-CM

## 2024-05-24 DIAGNOSIS — E03.9 HYPOTHYROIDISM, ADULT: ICD-10-CM

## 2024-05-24 DIAGNOSIS — Z00.00 ROUTINE HEALTH MAINTENANCE: Primary | ICD-10-CM

## 2024-05-24 DIAGNOSIS — T45.1X5A NEUROPATHY DUE TO CHEMOTHERAPEUTIC DRUG (MULTI): ICD-10-CM

## 2024-05-24 DIAGNOSIS — C22.1 CHOLANGIOCARCINOMA (MULTI): ICD-10-CM

## 2024-05-24 PROBLEM — M54.16 LUMBAR RADICULOPATHY: Status: RESOLVED | Noted: 2020-10-20 | Resolved: 2024-05-24

## 2024-05-24 PROBLEM — D50.9 IRON DEFICIENCY ANEMIA, UNSPECIFIED: Status: RESOLVED | Noted: 2023-07-19 | Resolved: 2024-05-24

## 2024-05-24 PROBLEM — Z86.79 HISTORY OF HYPERTENSION: Status: ACTIVE | Noted: 2023-07-19

## 2024-05-24 PROCEDURE — 1036F TOBACCO NON-USER: CPT | Performed by: INTERNAL MEDICINE

## 2024-05-24 PROCEDURE — 99396 PREV VISIT EST AGE 40-64: CPT | Performed by: INTERNAL MEDICINE

## 2024-05-24 RX ORDER — ALPRAZOLAM 0.25 MG/1
0.25 TABLET ORAL 3 TIMES DAILY PRN
COMMUNITY

## 2024-05-24 RX ORDER — FERROUS SULFATE 325(65) MG
325 TABLET, DELAYED RELEASE (ENTERIC COATED) ORAL 3 TIMES WEEKLY
Start: 2024-05-24 | End: 2025-05-24

## 2024-05-24 RX ORDER — GABAPENTIN 300 MG/1
300 CAPSULE ORAL NIGHTLY
Qty: 90 CAPSULE | Refills: 3 | Status: SHIPPED | OUTPATIENT
Start: 2024-05-24

## 2024-05-24 RX ORDER — GABAPENTIN 100 MG/1
100 CAPSULE ORAL 3 TIMES DAILY PRN
Qty: 270 CAPSULE | Refills: 0 | Status: SHIPPED | OUTPATIENT
Start: 2024-05-24 | End: 2024-08-22

## 2024-05-24 RX ORDER — ALCOHOL 2.38 KG/3.79L
1 GEL TOPICAL DAILY
Qty: 90 CAPSULE | Refills: 3 | Status: SHIPPED | OUTPATIENT
Start: 2024-05-24

## 2024-05-24 ASSESSMENT — PATIENT HEALTH QUESTIONNAIRE - PHQ9
SUM OF ALL RESPONSES TO PHQ9 QUESTIONS 1 AND 2: 0
1. LITTLE INTEREST OR PLEASURE IN DOING THINGS: NOT AT ALL
2. FEELING DOWN, DEPRESSED OR HOPELESS: NOT AT ALL

## 2024-05-24 NOTE — ASSESSMENT & PLAN NOTE
Annual Wellness exam completed   Preventive Health History reviewed  Ordered:   Labs    Mammogram   BMD   PCV 20 indicated given chemo - she will discuss with oncology

## 2024-05-24 NOTE — PROGRESS NOTES
ANNUAL CPE VISIT  Chief Complaint   Patient presents with    Annual Exam     HPI: Mild neuropathy symptoms, fatgue and diarrhea from her chemo  Plan through Fall  CT abd: 1.  No new mass or lymphadenopathy   2.  Decreased size of a perihepatic fluid collection   3.  Decreased ascites   4.  Decreased size of a right pleural effusion     Has Plerex tube in  To stay in until 100cc/week  Is decreasing as is her ascites  Hurts her    Labs reviewed:  Component      Latest Ref Rng 5/8/2024   % Saturation      25 - 45 % 11 (L)    Started on iron    Component      Latest Ref Rng 5/8/2024   Neutrophils %, Manual      40.0 - 80.0 % 79.1    Lymphocytes %, Manual      13.0 - 44.0 % 13.9    Monocytes %, Manual      2.0 - 10.0 % 5.2    Eosinophils %, Manual      0.0 - 6.0 % 1.8    Basophils %, Manual      0.0 - 2.0 % 0.0    Seg Neutrophils Absolute, Manual      1.20 - 7.00 x10*3/uL 6.80    Lymphocytes Absolute, Manual      1.20 - 4.80 x10*3/uL 1.20    Monocytes Absolute, Manual      0.10 - 1.00 x10*3/uL 0.45    Eosinophils Absolute, Manual      0.00 - 0.70 x10*3/uL 0.15    Basophils Absolute, Manual      0.00 - 0.10 x10*3/uL 0.00    Total Cells Counted 115    RBC Morphology No significant RBC morphology present    Clumped Platelets Present    LEUKOCYTES (10*3/UL) IN BLOOD BY AUTOMATED COUNT, [place]      4.4 - 11.3 x10*3/uL 8.6    nRBC      0.0 - 0.0 /100 WBCs 0.0    ERYTHROCYTES (10*6/UL) IN BLOOD BY AUTOMATED COUNT, [place]      4.00 - 5.20 x10*6/uL 2.60 (L)    HEMOGLOBIN      12.0 - 16.0 g/dL 8.7 (L)    HEMATOCRIT      36.0 - 46.0 % 26.5 (L)    MCV      80 - 100 fL 102 (H)    MCH      26.0 - 34.0 pg 33.5    MCHC      32.0 - 36.0 g/dL 32.8    RED CELL DISTRIBUTION WIDTH --    PLATELETS (10*3/UL) IN BLOOD AUTOMATED COUNT, [place]      150 - 450 x10*3/uL 337    Immature Granulocytes %, Automated      0.0 - 0.9 % 0.3    Immature Granulocytes Absolute, Automated      0.00 - 0.70 x10*3/uL 0.03    Albumin      3.4 - 5.0 g/dL 3.0  (L)    Bilirubin Total      0.0 - 1.2 mg/dL 1.4 (H)    Bilirubin, Direct      0.0 - 0.3 mg/dL 0.3    Alkaline Phosphatase      33 - 136 U/L 191 (H)    ALT      7 - 45 U/L 54 (H)    AST      9 - 39 U/L 43 (H)    Total Protein      6.4 - 8.2 g/dL 5.1 (L)    Vitamin B12      211 - 911 pg/mL 435    Thyroid Stimulating Hormone      0.44 - 3.98 mIU/L 5.49 (H)    Triiodothyronine, Free      2.3 - 4.2 pg/mL 2.2 (L)    Triiodothyronine      60 - 200 ng/dL 77         Assessment and Plan:  Problem List Items Addressed This Visit          High    Routine health maintenance - Primary    Overview         Influenza 8/24/2015, 2020, 10/25/21, 10/1/22. 9/21/23      Pfizer COVID 19 vaccine 3/6/21, 3/27/21, 12/15/21         RSV 12/1/23      TD Adult4/27/2004, 10/13/2020      Tdap (Age 7+)2/4/2011, 12/1/23       Zostavax 8/24/2015      Shingrix: 10/2020: 12/2020       PAP/HPV 2014 (-), 9/14/18 (-); 11/15/22 (-)      Hep C Ab (-) 5/28/15      Mamm 11/8/18; 12/19/19, 12/24/20, 12/29/22; 5/10/2024      BMD 11/8/18, 2/8/21      Cscope 2015 (7yrs); 11/22/22 (7-10 yrs)         Current Assessment & Plan     Annual Wellness exam completed   Preventive Health History reviewed  Ordered:   Labs    Mammogram   BMD   PCV 20 indicated given chemo - she will discuss with oncology             Relevant Orders    Comprehensive Metabolic Panel    CBC and Auto Differential    Lipid Panel    Urinalysis with Reflex Culture and Microscopic       Medium    Anemia    Overview     Hematology consulted in January 2020.  Normocytic anemia for many years and has been relatively stable.    On B12 supplement (level was low at 236 in 10/20), parenteral x 3months, switched to oral 1/21  Hb 11.3 in 2015  In 5/24 due to capecitabine 1000 mg BID and also iron deficient  Started on iron         Relevant Medications    ferrous sulfate 325 (65 Fe) MG EC tablet    Other Relevant Orders    Folate    Iron and TIBC    Ferritin    Iron and TIBC    Ferritin    Cholangiocarcinoma  (Multi)    Overview     Centrally located intrahepatic cholangiocarinoma. Resected.  Stage IB (pT1b, pN0, cM0)   DPYD and UGT1A1: Intermediate metabolizer for both.   GENOMIC PROFILE (CARIS): FGFR2 mutation, KMT2C, MYLES. TMB 2 mut/Mb.  Incidentally found 5.8cm intrahepatic cholangiocarcinoma, discovered by clinical trial at [facility]. (Had positive screen noted on her Grail test for: Liver, Bile duct cancer and Pancreas, Gallbladder cancer)  Liver biopsy 11/23: Adenocarcinoma involving liver parenchyma.   12/23 PET SCAN: 5.7 cm central hepatic moderately avid cholangiocarcinoma, No FDG avid lymphadenopathy , no evidence of metastatic disease.   S/p diagnostic laparoscopy converted to exploratory laparotomy, extended left hepatectomy, intraoperative ultrasound, and hilar lympadenectomy (12/15/2023)   Complicated by recurrent post-op pleural effusion  s/p IR placement of R pleurx catheter 2/11/24.   Started adjuvant capecitabine 1000 mg BID 3/24, plan thru Fall 2024  Sees Oncology [facility]           Chronic insomnia    Overview     On Gabapentin         Encounter for screening mammogram for breast cancer    Relevant Orders    BI mammo bilateral screening tomosynthesis    History of thyroid cancer    Overview     Occult papillary Carcinoma;mutifocal micropapillary thyroid cancer.   Follicular Adenoma;mutifocal micropapillary thyroid cancer.  Keep TSH low, no need for complete suppression though         Hypothyroidism, adult    Overview     Goal TSH low, but doesnt have to be undetectable      On synthroid         Relevant Orders    TSH with reflex to Free T4 if abnormal    Lumbar spondylosis    Overview     MRI 8/20: Degenerative grade 1 retrolisthesis of L5 over S1 with exaggeration of the overall lumbar lordosis. Incidental S2/3 level Tarlov cysts. Degenerative disc disease centered at the L5/S1 level with considerable loss of disc height with mild endplate changes with additional multilevel minimal endplate changes. No  concerning marrow changes. 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       Maximum heart rate was 130 bpm at 06:37.       Minimum heart rate was 64 bpm at 09:35.       The average heart rate was 70 bpm.       Rare ventricular ectopic singles.       Rare supraventricular ectopic singles, couplets,       bigeminy, and runs. Th fastest run 4 beats 156 bpm       at13:08 and longest run 13 beats 136 bpm at 17:51.       Patient's diary at 10:58 on 9/11/13 did correlate with       ecg signal with ventricular ectopic singles.       Holter 8/22: NSR, average HR 69 (min 52, max 137)      AFIB/AFLUTTER 0%      SVE: BUrden 0.28% max count/24 hr was 275      SVT: 25 events. longest 14 beats; fastest 170      Pause: ) events      AVB; 0%      PVC 0.01% max/24h= 11      VT: 0 events      Pt recorded no events      s/p ER consult 11/22: 2 soft indications for treatment of her ectopy, namely to decrease      her symptoms and to decrease the risk of future atrial fibrillation.      acetabutol prn prescribed         Pleural effusion    Overview     s/p IR placement of R pleurx catheter 2/11/24  Managed by [facility]         Vitamin B12 deficiency    Overview     B12 236 in 10/20      Started B12 supplements, parenteral x 3months then changed to oral in 1/21      Monitor         Relevant Orders    Vitamin B12    Vitamin D deficiency    Overview     Goal 40-50      On supplement      Monitor          "Relevant Orders    Vitamin D 25-Hydroxy,Total (for eval of Vitamin D levels)       ROS otherwise negative aside from what was mentioned above in HPI.    Vitals  /71   Pulse 84   Temp 35.9 °C (96.6 °F)   Ht 1.727 m (5' 8\")   Wt 55.8 kg (123 lb 2 oz)   SpO2 100%   BMI 18.72 kg/m²   Body mass index is 18.72 kg/m².  Physical Exam  Gen: Alert, NAD  HEENT:  Normal exam  Neck:  No masses/nodes palpable; Thyroid nontender and without nodules; No ELINOR  Respiratory:  Lungs CTAB  CV: RRR  Neuro:  Gross motor and sensory intact  Skin:  No suspicious lesions present  Breast: No masses or axillary lymphadenopathy  Gyn: Normal pelvic exam: no uterine masses or cervical lesions, or CMT; no vaginal D/C. No ovarian or adnexal masses; No external vaginal or anal/perineal lesions (Pt declined chaperone)    Allergies and Medications  Albuterol, Amitriptyline, and Levofloxacin  Current Outpatient Medications   Medication Instructions    acebutolol (SECTRAL) 200 mg, oral, As needed, Takes only when having rapid heart beat per cardiology    acetaminophen (TYLENOL) 500 mg, oral, 4 times daily    ALPRAZolam (XANAX) 0.25 mg, oral, 3 times daily PRN    amoxicillin (AMOXIL) 2,000 mg, oral, 1 hour before procedure.    ascorbic acid (Vitamin C) 500 mg chewable tablet 1 tablet, oral, 2 times daily    CAPECITABINE ORAL 1,000 mg, oral, 2 times daily    cholecalciferol (Vitamin D-3) 25 MCG (1000 UT) tablet 1 tablet, oral, Daily    ferrous sulfate 325 (65 Fe) MG EC tablet 1 tablet, oral, 3 times weekly, Do not crush, chew, or split.    furosemide (LASIX) 20 mg, oral, Daily PRN    gabapentin (NEURONTIN) 100 mg, oral, 3 times daily PRN    gabapentin (NEURONTIN) 300 mg, oral, Nightly    ibuprofen (AdviL) 200 mg tablet 2 tablets, oral, Every 6 hours PRN    [id number]-algal oil (Metanx, algal oil,) 3 mg-35 mg-2 mg -90.314 mg capsule 1 capsule, oral, Daily    levothyroxine (Synthroid, Levoxyl) 112 mcg tablet Take one pill daily and an " extra pill once weekly    ondansetron (ZOFRAN) 8 mg, oral, Every 8 hours PRN       Active Problem List  Patient Active Problem List   Diagnosis    Allergic rhinitis    Anemia    Chronic insomnia    Grade I hemorrhoids    History of thyroid cancer    Hypothyroidism, adult    Lumbar disc disease    Paroxysmal supraventricular tachycardia (CMS-HCC)    Vitamin B12 deficiency    Vitamin D deficiency    Chronic cough    History of hypertension    Routine health maintenance    Primary osteoarthritis involving multiple joints    History of Helicobacter pylori infection    Anxiety about health    Cholangiocarcinoma (Multi)    Pleural effusion    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    H/O bone density study     11/18: normal     2/8/21: normal    H/O chest x-ray     2013(-)     11/21: normal    H/O chest x-ray 12/15/2023    normal    H/O chest x-ray 04/10/2024    Small bilateral pleural effusions    H/O chest x-ray 05/22/2024    There is interval improvement of mild patchy opacities in the left lower lung.  Decrease in size of left-sided pleural effusion is also noted.  There is similar blunting of the right lateral costophrenic angle.    H/O CT scan     CT Calcium score 09/18:  NORMAL    H/O CT scan of abdomen 11/03/2023    5.7 CM LOBULAR ENHANCING MASS IN THE CENTRAL ASPECT OF THE LIVER WITH SUSPECTED VASCULAR INVOLVEMENT AND ABDOMINAL LYMPHADENOPATHY.  PRIMARY HEPATIC LESION CONSIDERED MOST LIKELY WITH LEADING  DIFFERENTIAL CONSIDERATION OF CHOLANGIOCARCINOMA.  SUBTLE HAZY MESENTERIC DENSITY IN THE PERIPHERY OF THE ABDOMEN SUGGESTIVE OF DEVELOPING CARCINOMATOSIS    H/O CT scan of abdomen 01/12/2024    Postoperative changes of extended left hepatectomy with increased size of postoperative collection along the resection margin.  No evidence of residual tumor.  Stable heterogeneous enhancement in the inferior RIGHT lobe, probably due to poor venous drainage related to the accessory RIGHT hepatic vein visualized on preoperative imaging and either occluded or thrombosed on the current imaging.    H/O CT scan of abdomen 05/20/2024    1.  No new mass or lymphadenopathy  2.  Decreased size of a perihepatic fluid collection  3.  Decreased ascites  4.  Decreased size of a right pleural effusion, Subcentimeter lesions that are too small to characterize but  likely benign.  Left peripelvic cyst    H/O CT scan of brain     8/20: normal    H/O CT scan of chest     12/1/22: 1.  Minimal atelectasis is present in the right middle lobe and in     the lingula. There is no abnormality noted to explain the patient's     dyspnea    H/O CT scan of chest 11/29/2023    normal    H/O Doppler ultrasound     12/21: KOBE (-)    H/O echocardiogram     11/21: Summary:     1. The resting ejection fraction was estimated at 55% with a peak exercise ejection     fraction estimated at 65 to 70%.     2. Peak HR= 148bpm which is 93% of APMHR.     3. Peak BP= 198/84; hypertensive response to exercise.     4. METS acheived= 7.     5. Average exercise capacity.     6. RVSP higher post-exercise.     7. No electrocardiographic or echocardiographic evidence    H/O magnetic resonance imaging 11/29/2023    MRI abd: Central hepatic mass, presumably a cholangiocarcinoma.  This has vascular  and biliary abutment    History of Holter monitoring     2013: PVCs, SVT runs (AT per cardiology)      Sinus rhythm / tachycardia.      Maximum heart rate was 130 bpm at 06:37.       Minimum heart rate was 64 bpm at 09:35.      The average heart rate was 70 bpm.      Rare ventricular ectopic singles.      Rare supraventricular ectopic singles, couplets,      bigeminy, and runs. Th fastest run 4 beats 156 bpm      at13:08 and longest run 13 beats 136 bpm    History of MRI of lumbar spine     8/20: Degenerative grade 1 retrolisthesis of L5 over S1 with exaggeration of      the overall lumbar lordosis.           Incidental S2/3 level Tarlov cysts.           Degenerative disc disease centered at the L5/S1 level with considerable      loss of disc height with mild endplate changes with additional multilevel      minimal endplate changes. No concerning marrow changes.      L3/4: Mild facet    History of ultrasound, pelvic     09/2018 unremarkable pelvic ultrasound     Past Surgical History:   Procedure Laterality Date    BREAST BIOPSY      12/18: Usual ductal hyperplasia and apocrine      metaplasia in a fibroelastotic stroma, suggestive of radial scar.    CHOLECYSTECTOMY  08/19/2018    Cholecystectomy    COLONOSCOPY      2015: Colonoscopy - normal - repeat in 7yrs.; Internal hemorrhoids     11/22: Diverticulosis and Hemorrhoids    DILATION AND CURETTAGE OF UTERUS      Dilation & curettage; video hysteroscopy and endometrial ablation with Novasure    ESOPHAGOGASTRODUODENOSCOPY  11/22/2022 11/22: erythematous mucosa in stomach, duodenal polyp     bx: HP    EXPLORATORY LAPAROTOMY      diagnostic laparoscopy converted to exploratory laparotomy, extended left hepatectomy, intraoperative ultrasound, and hilar lympadenectomy (12/15/2023)    FINGER SURGERY      2/12/2019 [facility] Dr. Tse. bone, trapezium, left, excision - degenerative joint disease     and focal osteonecrosis.  L thumb ligament reconstruction and tendon interposition     arthroplasty    HAND SURGERY      left [facility] joint replacement    JOINT REPLACEMENT  knee 5/23    LARYNGOSCOPY  08/19/2018    Direct Laryngoscopy    LIVER BIOPSY  11/29/2023     Adenocarcinoma involving liver parenchyma. . The overall immunophenotype would support a metastatic adenocarcinoma of pancreaticobiliary origin or a primary intrahepatic cholangiocarcinoma    LUNG DECORTICATION  2024    s/p IR placement of R pleurx catheter 24.    THORACENTESIS  2023    PLEURAL FLUID RIGHT             Negative for malignant cells.           Acute and chronic inflammation and reactive mesothelial cells.    THYROIDECTOMY, PARTIAL      mutifocal micropapillary thyroid cancer. Left. Keep TSH low (no need for complete     suppression though)       Social History     Social History Narrative    Family History:        F:  Lung CA, OA, AFIB ( age 68)        M; HTN, DJD/OA, Hyperlipidemia, colon polyps, stage 4 lung CA ( age 74)           B:  Lipids                                                           B:  Lipids , HTN                                                                                B: CAD/ MI age 53, HTN        B: CAD/CABG                                    B: CAD, HTN        Social History:     · , 2 kids        Works at [employer], off until      · No illicit drug use     · Non-smoker      · Consumes alcohol occasionally

## 2024-06-13 ENCOUNTER — TELEPHONE (OUTPATIENT)
Dept: PRIMARY CARE | Facility: CLINIC | Age: 64
End: 2024-06-13
Payer: COMMERCIAL

## 2024-06-13 DIAGNOSIS — E03.9 HYPOTHYROIDISM, ADULT: ICD-10-CM

## 2024-06-13 RX ORDER — LEVOTHYROXINE SODIUM 137 UG/1
137 TABLET ORAL DAILY
Qty: 30 TABLET | Refills: 11 | Status: SHIPPED | OUTPATIENT
Start: 2024-06-13 | End: 2025-06-13

## 2024-06-13 NOTE — TELEPHONE ENCOUNTER
Spoke with patient.  Relayed message and recommendations.  Patient verbally understood      Formatted labs as well as medication.

## 2024-06-13 NOTE — TELEPHONE ENCOUNTER
Triage if taking any biotin, would repeat off it if so and in future never take it on days gets TFT labs done (it interferes with the actual test)  If no biotin use and If med list is correct she is taking average of 128mcg daily  Increase to 137mcg daily and repeat in 6 weeks, TSH w/reflex and T3  Format/Send in    Also add free T4 and T3 to [facility] labs from yesterday    Order all

## 2024-06-13 NOTE — TELEPHONE ENCOUNTER
Patient left  stating her TSH was 34 and wants to know what she should do.   Patient had them done yesterday at [facility].   Printed out the lab result and placed on your desk.  Tried to put them in with Care everywhere but they did not pull in.  Please advise

## 2024-08-01 ENCOUNTER — PATIENT MESSAGE (OUTPATIENT)
Dept: PRIMARY CARE | Facility: CLINIC | Age: 64
End: 2024-08-01
Payer: COMMERCIAL

## 2024-08-07 ENCOUNTER — APPOINTMENT (OUTPATIENT)
Dept: PRIMARY CARE | Facility: CLINIC | Age: 64
End: 2024-08-07
Payer: COMMERCIAL

## 2024-08-07 DIAGNOSIS — F41.8 ANXIETY ABOUT HEALTH: Primary | ICD-10-CM

## 2024-08-07 PROCEDURE — 99213 OFFICE O/P EST LOW 20 MIN: CPT | Performed by: NURSE PRACTITIONER

## 2024-08-07 PROCEDURE — 1036F TOBACCO NON-USER: CPT | Performed by: NURSE PRACTITIONER

## 2024-08-07 RX ORDER — SERTRALINE HYDROCHLORIDE 25 MG/1
25 TABLET, FILM COATED ORAL DAILY
Qty: 30 TABLET | Refills: 3 | Status: SHIPPED | OUTPATIENT
Start: 2024-08-07 | End: 2024-08-09 | Stop reason: SDUPTHER

## 2024-08-07 NOTE — PROGRESS NOTES
"An interactive audio/visual telecommunication system which permits real time communications between the patient (at the originating site) and provider (at the distant site) was utilized to provide this telehealth service.    Verbal consent was requested and obtained from the patient for this telehealth visit.  We have confirmed the patient wishes to see me, Beth Velazco, a board certified family nurse practitioner with an active [facility] license as well as verified the patient's identity and physical location in [place].      Problem List Items Addressed This Visit       Anxiety about health - Primary    Overview     Lexapro wo relief  Has xanax prn           Current Assessment & Plan     start her preference zoloft  ok to titrate +/-  discussed [facility]  30 day VV fu          Relevant Medications    sertraline (Zoloft) 25 mg tablet        Subjective   Patient ID: Isabel Burroughs is a 63 y.o. female who presents for No chief complaint on file..  HPI  Anxiety & depression sx  Worries about her health  Doing chemo now  Lexapro wo relief    Review of Systems   All other systems reviewed and are negative.      BP Readings from Last 3 Encounters:   05/24/24 115/71   03/19/24 124/70   07/19/23 (!) 154/96      Wt Readings from Last 3 Encounters:   05/24/24 55.8 kg (123 lb 2 oz)   05/08/24 58.1 kg (128 lb)   03/19/24 55.3 kg (122 lb)      BMI:   Estimated body mass index is 18.72 kg/m² as calculated from the following:    Height as of 5/24/24: 1.727 m (5' 8\").    Weight as of 5/24/24: 55.8 kg (123 lb 2 oz).    Objective   Physical Exam  Gen: Alert, NAD  Respiratory:  resp effort NL, speaking in complete sentences   Neuro:  coordination intact   Mood: normal    Sitting upright    "

## 2024-08-09 DIAGNOSIS — F41.8 ANXIETY ABOUT HEALTH: ICD-10-CM

## 2024-08-09 RX ORDER — SERTRALINE HYDROCHLORIDE 25 MG/1
25 TABLET, FILM COATED ORAL DAILY
Qty: 30 TABLET | Refills: 3 | Status: SHIPPED | OUTPATIENT
Start: 2024-08-09

## 2024-08-12 ENCOUNTER — LAB (OUTPATIENT)
Dept: LAB | Facility: LAB | Age: 64
End: 2024-08-12
Payer: COMMERCIAL

## 2024-08-12 DIAGNOSIS — E03.9 HYPOTHYROIDISM, ADULT: ICD-10-CM

## 2024-08-12 PROCEDURE — 36415 COLL VENOUS BLD VENIPUNCTURE: CPT

## 2024-08-12 PROCEDURE — 84481 FREE ASSAY (FT-3): CPT

## 2024-08-12 PROCEDURE — 84439 ASSAY OF FREE THYROXINE: CPT

## 2024-08-12 PROCEDURE — 84443 ASSAY THYROID STIM HORMONE: CPT

## 2024-08-13 LAB
T3FREE SERPL-MCNC: 3.4 PG/ML (ref 2.3–4.2)
T4 FREE SERPL-MCNC: 1.48 NG/DL (ref 0.78–1.48)
TSH SERPL-ACNC: 0.08 MIU/L (ref 0.44–3.98)

## 2024-09-12 ENCOUNTER — APPOINTMENT (OUTPATIENT)
Dept: PRIMARY CARE | Facility: CLINIC | Age: 64
End: 2024-09-12
Payer: COMMERCIAL

## 2024-09-12 DIAGNOSIS — F41.8 ANXIETY ABOUT HEALTH: ICD-10-CM

## 2024-09-12 PROCEDURE — 99442 PR PHYS/QHP TELEPHONE EVALUATION 11-20 MIN: CPT | Performed by: NURSE PRACTITIONER

## 2024-09-12 PROCEDURE — 1036F TOBACCO NON-USER: CPT | Performed by: NURSE PRACTITIONER

## 2024-09-12 RX ORDER — SERTRALINE HYDROCHLORIDE 25 MG/1
37.5 TABLET, FILM COATED ORAL DAILY
Qty: 45 TABLET | Refills: 3 | Status: SHIPPED | OUTPATIENT
Start: 2024-09-12

## 2024-09-12 NOTE — PROGRESS NOTES
"An interactive audio/visual telecommunication system which permits real time communications between the patient (at the originating site) and provider (at the distant site) was utilized to provide this telehealth service.    Verbal consent was requested and obtained from the patient for this telehealth visit.  We have confirmed the patient wishes to see me, Beth Velazco, a board certified family nurse practitioner with an active [facility] license as well as verified the patient's identity and physical location in [place].    Problem List Items Addressed This Visit       Anxiety about health    Overview     Lexapro wo relief  Has xanax prn  9/12/24: increase zoloft 25 mg every day to 37.5 mg every day; order [facility]            Relevant Medications    sertraline (Zoloft) 25 mg tablet        Subjective   Patient ID: Isabel Burroughs is a 63 y.o. female who presents for Follow-up.  HPI  Anxiety about health  One more round of chemo  Good support  Started zoloft 25 mg every day  Does get nausea  ? Chemo v zoloft  Thinks zoloft may be helping   Doesn't feel depressed or down      Review of Systems   All other systems reviewed and are negative.      BP Readings from Last 3 Encounters:   05/24/24 115/71   03/19/24 124/70   07/19/23 (!) 154/96      Wt Readings from Last 3 Encounters:   05/24/24 55.8 kg (123 lb 2 oz)   05/08/24 58.1 kg (128 lb)   03/19/24 55.3 kg (122 lb)      BMI:   Estimated body mass index is 18.72 kg/m² as calculated from the following:    Height as of 5/24/24: 1.727 m (5' 8\").    Weight as of 5/24/24: 55.8 kg (123 lb 2 oz).    Objective   Physical Exam  No acute distress  Speaking in complete sentences  Oriented   "

## 2024-11-04 ENCOUNTER — PATIENT OUTREACH (OUTPATIENT)
Dept: PRIMARY CARE | Facility: CLINIC | Age: 64
End: 2024-11-04
Payer: COMMERCIAL

## 2024-11-04 NOTE — PROGRESS NOTES
Discharge facility: [facility]   Discharge diagnosis: Recurrent right pleural effusion   Admission date: 10/29/24  Discharge date: 11/2/24    PCP Appointment Date: 6/2/24, declines to schedule , message sent to office  Specialist Appointment Date:   Hospital Encounter and Summary: Linked    See Discharge assessment below for further details    Medications  Medications reviewed with patient/caregiver?: Yes (11/4/2024 12:29 PM)  Is the patient having any side effects they believe may be caused by any medication additions or changes?: No (11/4/2024 12:29 PM)  Does the patient have all medications ordered at discharge?: Yes (11/4/2024 12:29 PM)  Care Management Interventions: Provided patient education (11/4/2024 12:29 PM)  Prescription Comments: Prescriptions sent for oxyCODONE IR (ROXICODONE) 5 mg immediate release tablet   Indications: Acute post-operative pain Take 1 tablet by mouth every 6 hours as needed for up to 7 days.   28 tablet     11/02/2024 11/09/2024  metoprolol tartrate, short acting, (LOPRESSOR) 25 mg tablet   Take 0.5 tablets by mouth every 12 hours.   30 tablet     11/02/2024 12/02/2024  pantoprazole DR (PROTONIX) 40 mg tablet   Take 1 tablet by mouth daily at 6 am.   30 tablet     11/02/2024 12/02/2024  polyethylene glycol 3350 17 gram packet   Take 1 Packet by mouth once daily. Dissolve dose in 4 - 8 ounces of liquid and take as directed.     11/02/2024 (11/4/2024 12:29 PM)  Is the patient taking all medications as directed (includes completed medication regime)?: Yes (11/4/2024 12:29 PM)  Care Management Interventions: Provided patient education (11/4/2024 12:29 PM)  Medication Comments: Reviewed new medications of oxyCODONE IR (ROXICODONE) 5 mg immediate release tablet   Indications: Acute post-operative pain Take 1 tablet by mouth every 6 hours as needed for up to 7 days.     metoprolol tartrate, short acting, (LOPRESSOR) 25 mg tablet   Take 0.5 tablets by mouth every 12 hours.    30 tablet      pantoprazole DR (PROTONIX) 40 mg tablet   Take 1 tablet by mouth daily      polyethylene glycol 3350 17 gram packet   Take 1 Packet by mouth once daily. Dissolve dose in 4 - 8 ounces of liquid and take as directed.         acetaminophen (TYLENOL) 325 mg tablet   Take 2 tablets by mouth every 6 hours. (11/4/2024 12:29 PM)  Follow Up Tasks: -- (refills may be needed for metoprolol. Patient uncertain as to why she was stated on it.) (11/4/2024 12:29 PM)    Appointments  Does the patient have a primary care provider?: Yes (11/4/2024 12:29 PM)  Care Management Interventions: Educated patient on importance of making appointment (Declines to schedule at this time) (11/4/2024 12:29 PM)  Has the patient kept scheduled appointments due by today?: Yes (11/4/2024 12:29 PM)  Care Management Interventions: Educated on importance of keeping appointment; Advised patient to keep appointment (11/4/2024 12:29 PM)  Follow Up Tasks: -- (n/a) (11/4/2024 12:29 PM)    Self Management  What is the home health agency?: n/a-declines need (11/4/2024 12:29 PM)  Has home health visited the patient within 72 hours of discharge?: Not applicable (11/4/2024 12:29 PM)  Home Health Interventions: -- (n/a) (11/4/2024 12:29 PM)  What Durable Medical Equipment (DME) was ordered?: n/a (11/4/2024 12:29 PM)  Has all Durable Medical Equipment (DME) been delivered?: -- (n/a) (11/4/2024 12:29 PM)  DME Interventions: -- (n/a) (11/4/2024 12:29 PM)  Care Management Interventions: Notified PCP/provider (11/4/2024 12:29 PM)  Follow Up Tasks: -- (n/a) (11/4/2024 12:29 PM)    Patient Teaching  Does the patient have access to their discharge instructions?: Yes (11/4/2024 12:29 PM)  Care Management Interventions: Reviewed instructions with patient (11/4/2024 12:29 PM)  What is the patient's perception of their health status since discharge?: Improving (11/4/2024 12:29 PM)  Is the patient/caregiver able to teach back the hierarchy of who to call/visit for  symptoms/problems? PCP, Specialist, Home Health nurse, Urgent Care, ED, 911: Yes (11/4/2024 12:29 PM)  Patient/Caregiver Education Comments: Instructed on hospital discharge instructions. Instructed on new and changed medications. Instructed if increased shortness of breath or chest pains to call 911. Provided my contact information and encouraged to call with any questions. (11/4/2024 12:29 PM)    Wrap Up  Wrap Up Additional Comments: Patient reports doing alright. She states she has some discomfort at insertion sites from where tubes where placed but nothing that is too extreme. She sttaes she has numerous other follow up appts scheduled. She is currently not planning to follow up with PCP any time soon. message sent to office. Encouraged patient to call with any questions. (11/4/2024 12:29 PM)

## 2024-11-18 ENCOUNTER — APPOINTMENT (OUTPATIENT)
Dept: PRIMARY CARE | Facility: CLINIC | Age: 64
End: 2024-11-18
Payer: COMMERCIAL

## 2024-11-18 ENCOUNTER — PATIENT MESSAGE (OUTPATIENT)
Dept: PRIMARY CARE | Facility: CLINIC | Age: 64
End: 2024-11-18

## 2024-11-18 DIAGNOSIS — U07.1 COVID-19: ICD-10-CM

## 2024-11-18 DIAGNOSIS — Z85.850 HISTORY OF THYROID CANCER: ICD-10-CM

## 2024-11-18 DIAGNOSIS — C22.1 CHOLANGIOCARCINOMA (MULTI): ICD-10-CM

## 2024-11-18 DIAGNOSIS — E03.9 HYPOTHYROIDISM, ADULT: ICD-10-CM

## 2024-11-18 DIAGNOSIS — C22.1 CHOLANGIOCARCINOMA (MULTI): Primary | ICD-10-CM

## 2024-11-18 DIAGNOSIS — R09.1: ICD-10-CM

## 2024-11-18 DIAGNOSIS — J90 PLEURAL EFFUSION: Primary | ICD-10-CM

## 2024-11-18 PROCEDURE — 1036F TOBACCO NON-USER: CPT | Performed by: NURSE PRACTITIONER

## 2024-11-18 PROCEDURE — 99215 OFFICE O/P EST HI 40 MIN: CPT | Performed by: NURSE PRACTITIONER

## 2024-11-18 NOTE — PROGRESS NOTES
"An interactive audio and video telecommunication system which permits real time communications between the patient (at the originating site) and provider (at the distant site) was utilized to provide this telehealth service.  Verbal consent was requested and obtained from the patient for this telehealth visit.     Non-billable TCM    Patient: Isabel Burroughs  : 1960  PCP: Vashti Carpenter MD  MRN: [id number]  Program: No linked episodes    Admit date:  10/29/2024  Discharge date:2024  Discharge DX: Recurrent Right Pleural Effusion, Fibrothorax    Isabel Burroughs is a 63 y.o. female presenting today for follow-up after being discharged from the hospital 16 days ago. The main problem requiring admission was recurrent recurrent right pleural effusion, fibrothorax The discharge summary and/or Transitional Care Management documentation was reviewed. Medication reconciliation was performed as indicated via the \"Wayne as Reviewed\" timestamp.     Isabel Burroughs was contacted by Transitional Care Management services two days after her discharge. This encounter and supporting documentation was reviewed.    The complexity of medical decision making for this patient's transitional care is high.    Covid positive  Sx onset   Thought it was sinuses   has covid  Last night had worsening sinus HA  Sore throat  Cough  Nasal congestion  Positive today   Does not want antiviral    Dr Dr Gunn  Muscle buldging along incisional line  Is going to have mesh placed  Has to wait until Feb  Uncomfortable  Has to sit after 15 mins  Stopping her from being active/mobile         Hospital course copied:   PATIENT NAME: Isabel Burroughs ADMISSION DATE: 10/29/2024   MRN: [id number] DISCHARGE DATE: 2024     Attending Physician: Percy Powell MD Code Status: Full Code   Primary Service:     Admission Diagnosis: Recurrent right pleural effusion  Discharge Diagnosis: Fibrothorax    Reason for " Hospitalization: Isabel Burroughs is a 63 year old female with a PMH signifcant for liver resection for intrahepatic cholangiocarcinoma c/b right sided pleural effusion and re-admission from 1/12/24-1/24/24 in which a right-sided pigtail was placed and removed prior to discharge. CT chest 2/10 showed large pleural effusion on right side, with RML and right basilar partial collapse. Thoracic surgery consulted for recurrent effusion. Patient now s/p IR placement of R pleurx catheter 2/11/24.   Operations during Hospitalization:   Right video-assisted thoracoscopic decortication  Right video-assisted thoracoscopic pleural biopsy  Removal of Pleurx catheter  Intercostal nerve blockade    Hospital Course:  * How was the Reason for Hospitalization Addressed:  10/29/24: s/p R VATS decortication, pleural biopsy, pleurx removal, intercostal nerve blockade. 2x 24Fr CT and 19Fr jackie to -20sxn. Fentanyl PCA.  10/30/2024: jackie to bulb sxn, continue to -20sxn. D/c PCA, d/c mejía. On RA.  10/31/2024: d/c anterior chest tube. Tubes placed to waterseal. Hgb 7.5. given lasix + K.   11/1/2024: d/c posterior CT. Hgb 6.9, repeat 8. Toradol switched to ibuprofen. VSS on RA  11/2/2024: d/c jackie. Cxr stable. Discharge home  * What were the Active Issues:   - fibro thorax s/p surgery above  - acute post-operative pain; well controlled on oral analgesia  - acute post operative respiratory insufficiency; on RA  - acute post operative anemia due to blood loss; hgb stable around 8, no s/s of bleeding  - hypothyroidism; on home regimen  * Hospital Course Complicated by: N/a  * Extended Hospital Stay Due to: N/a  * Specific Medication Changes: add metoprolol x 30days, protonix x 30 days, tylenol, ibuprofen, oxycodone prn  * Surgical Pathology/Microbiology: surgical pathology in process  * Pain: well controlled  * Surgical Incisions/ Wounds: right thoracoport incisions  * Tubes/Lines/Drains on Discharge: none  * Patient Condition at  Discharge: Stable  * Disposition:    Home with Self Care     Labs copied:  See [facility]    Imaging copied:     CT abdomen pelvis w IV contrast  Order: [id number]  Impression    IMPRESSION: Postoperative changes of the liver with small cystic  structures.    No lymphadenopathy seen.    Inflammation with wall thickening of the ascending and proximal  transverse colon new since the previous exam.    Small amounts of loculated peritoneal fluid inferior to the right lobe  the liver and in the right pelvis    : MAGDALENA    Transcribe Date/Time: Nov 15 2024  5:47P    Dictated by : MO HAMPTON MD    This examination was interpreted and the report reviewed and  electronically signed by:  MO HAMPTON MD on Nov 17 2024  5:17PM  EST  Narrative    * * *Final Report* * *    DATE OF EXAM: Nov 12 2024  8:54AM      [facility]   0530  -  CT ABD/PEL W IVCON  / ACCESSION #  [id number]    PROCEDURE REASON: Intrahepatic cholangiocarcinoma (HCC)        * * * * Physician Interpretation * * * *     EXAMINATION:  CT ABDOMEN AND PELVIS WITH IV CONTRAST    CLINICAL HISTORY: Intrahepatic cholangiocarcinoma    TECHNIQUE: CT of the abdomen and pelvis was performed using standard  technique, scanning from just above the dome of the diaphragm to the  symphysis pubis.  MQ:  CTAP_3    Contrast:  IV:  100 ml of Omnipaque 350  : ml of    CT Radiation dose: Integrated Dose-length product (DLP) for this visit =    273 mGy*cm.  CT Dose Reduction Employed: Automated exposure control(AEC) and iterative  recon    COMPARISON: 05/16/2024.    RESULT:    Liver: Status post left lobe hepatectomy.    Biliary: Moderate intrahepatic biliary dilatation particularly in the  left lobe.  This was present previously.    Spleen: No mass. No splenomegaly.    Pancreas: No mass or duct dilation.    Adrenals: No mass.    Kidneys: The kidneys enhance symmetrically.  Peripelvic left renal cysts  are noted.  Subcentimeter cortical cysts are present.  No  definite  hydronephrosis    GI tract: There is colonic wall thickening of the right colon involving  the cecum and ascending colon and the proximal portion of the transverse  colon.  This is moderate in degree.  There is a large amount of stool in  the left colon and sigmoid colon.    Lymph nodes: No abdominal or pelvic lymphadenopathy.    Mesentery/Peritoneum: Small amount of fluid is seen inferior to the right  lobe of the liver.  This collection measures approximately  4.6 x 3.5 cm  series 3 image 63 similar to the previous examination.  A small amount of  pelvic free fluid is also noted series 3 image 97.    Retroperitoneum: No mass.    Vasculature: Abdominal aorta normal in caliber.  Calcifications at the  origin of the celiac axis.  Portal vein appears patent.  Hepatic veins  are not well opacified due to timing.    Pelvis: No mass, ascites or fluid collection. Uterus and bladder are  grossly unremarkable.    Bones/Soft Tissues: Degenerative changes lower lumbar spine.    Lower thorax: See separate dictation    Localizer images: No additional findings.  Exam End: 11/12/24 08:54    Specimen Collected: 11/12/24 08:54 Last Resulted: 11/17/24 17:19   Received From: [facility]  Result Received: 11/18/24 08:22       XR chest 2 views  Order: [id number]  Impression    IMPRESSION:    Persistent right pleural effusion.  Persistent airspace disease in the  right lung.  Minimal improvement    : PSCB    Transcribe Date/Time: Nov 14 2024  2:31P    Dictated by : LEDA ABREU MD    This examination was interpreted and the report reviewed and  electronically signed by:  LEDA ABREU MD on Nov 14 2024  2:33PM  EST  Narrative    * * *Final Report* * *    DATE OF EXAM: Nov 13 2024  8:59AM      LFX   5291  -  XR CHEST 2V FRONTAL/LAT  / ACCESSION #  [id number]    PROCEDURE REASON: Recurrent right pleural effusion        * * * * Physician Interpretation * * * *     EXAMINATION:  CHEST RADIOGRAPH (2 VIEW  FRONTAL & LATERAL)    CLINICAL HISTORY: Recurrent right pleural effusion  MQ:  XC2_6    EXAM DATE/TIME:  11/13/2024 8:59 AM    COMPARISON:  CT chest of 11/12/2024.  Chest x-ray of 11/2/2024 and  11/1/2024    RESULT:    Lines, tubes, and devices:  None.    Lungs and pleura:  Persistent right pleural effusion.  Persistent  airspace disease in the right lung.  Minimally improved.    No developing abnormality on the left.  No pneumothorax    Cardiomediastinal silhouette:  Normal cardiomediastinal silhouette.    Bones and soft tissues:  Unremarkable.  Exam End: 11/13/24 08:59    Specimen Collected: 11/13/24 08:59 Last Resulted: 11/14/24 14:35   Received From: [facility]  Result Received: 11/18/24 08:22     Review of Systems  ROS completely negative except what was mentioned in the HPI.  Problem List, surgical, social, and family histories which were reviewed and updated as necessary within the EMR. I also personally reviewed the notes, labs, and imaging that pertained to what was documented or discussed in the HPI.    Physical Exam  Vitals and nursing note reviewed.   Constitutional:       Appearance: Normal appearance.   HENT:      Head: Normocephalic and atraumatic.      Right Ear: External ear normal.      Left Ear: External ear normal.      Nose: Nose normal.      Mouth/Throat:      Mouth: Mucous membranes are moist.   Eyes:      Extraocular Movements: Extraocular movements intact.      Conjunctiva/sclera: Conjunctivae normal.   Pulmonary:      Effort: Pulmonary effort is normal.   Musculoskeletal:      Cervical back: Normal range of motion and neck supple.   Neurological:      General: No focal deficit present.      Mental Status: She is alert and oriented to person, place, and time. Mental status is at baseline.   Psychiatric:         Mood and Affect: Mood normal.         Behavior: Behavior normal.         Thought Content: Thought content normal.         Judgment: Judgment normal.         Problem List Items  Addressed This Visit       Cholangiocarcinoma (Multi)    Overview     Centrally located intrahepatic cholangiocarinoma. Resected.  Stage IB (pT1b, pN0, cM0)   DPYD and UGT1A1: Intermediate metabolizer for both.   GENOMIC PROFILE (CARIS): FGFR2 mutation, KMT2C, MYLES. TMB 2 mut/Mb.  Incidentally found 5.8cm intrahepatic cholangiocarcinoma, discovered by clinical trial at [facility]. (Had positive screen noted on her Grail test for: Liver, Bile duct cancer and Pancreas, Gallbladder cancer)  Liver biopsy 11/23: Adenocarcinoma involving liver parenchyma.   12/23 PET SCAN: 5.7 cm central hepatic moderately avid cholangiocarcinoma, No FDG avid lymphadenopathy , no evidence of metastatic disease.   S/p diagnostic laparoscopy converted to exploratory laparotomy, extended left hepatectomy, intraoperative ultrasound, and hilar lympadenectomy (12/15/2023)   Complicated by recurrent post-op pleural effusion  s/p IR placement of R pleurx catheter 2/11/24.   Started adjuvant capecitabine 1000 mg BID 3/24, plan thru Fall 2024  Sees Oncology [facility]           Current Assessment & Plan     Seen Gen Surg Dr Barone 11/14/24  Presented with a incision hernia, more likely de-innervation injury, from a previous Jess Jorge incision.   Plan:  Open abdominal wall reconstruction with mesh     Seen Onc Dr Proctor 11/14/24  Not on chemo currently, no reoccurrence  Plan is if anemia continues or worsens, they will refer to hematology         COVID-19    Current Assessment & Plan     Sx onset 11/16  Discussed risks and benefits to antivirals.  Pt declines and will continue symptomatic care         Fibrinous pleuritis    Overview     10/29/24  Surgical Pathology:  A - Pleural Cavity, Right, Fluid, Thoracentesis   Negative for malignant cells.  FINAL DIAGNOSIS   A. Lung, right chest pleura, biopsy:  - Acute fibrinous and chronic fibrous pleuritis (see comment).  - No tumor is seen.  B. Lung, right pleura, biopsy:  - Acute fibrinous and chronic fibrous  pleuritis (see comment).  - No tumor is seen.          History of thyroid cancer    Overview     Occult papillary Carcinoma;mutifocal micropapillary thyroid cancer.   Follicular Adenoma;mutifocal micropapillary thyroid cancer.  Keep TSH low, no need for complete suppression though         Relevant Orders    TSH    T4, free    T3, free    Hypothyroidism, adult    Overview     Goal TSH low, but doesnt have to be undetectable      On synthroid         Current Assessment & Plan     Last TSH 0.8  Repeat in 4-6 weeks         Relevant Orders    TSH    T4, free    T3, free    Pleural effusion - Primary    Overview     s/p IR placement of R pleurx catheter 2/11/24  10/29/24: s/p R VATS decortication, pleural biopsy, pleurx removal,   10/31/2024: d/c anterior chest tube. 11/1/2024: d/c posterior chest tube  Managed by [facility]         Current Assessment & Plan     Hospitalization 10/31 - 11/02  10/29/24: s/p R VATS decortication, pleural biopsy, pleurx removal, intercostal nerve blockade. 2x 24Fr CT and 19Fr jackie to -20sxn. Fentanyl PCA.  10/30/2024: jackie to bulb sxn, continue to -20sxn. D/c PCA, d/c mejía. On RA.  10/31/2024: d/c anterior chest tube. Tubes placed to waterseal. Hgb 7.5. given lasix + K.   11/1/2024: d/c posterior CT. Hgb 6.9, repeat 8. Toradol switched to ibuprofen. VSS on RA  11/2/2024: d/c jackie. Cxr stable. Discharge home     No complaints of fever/sweats/chills, no SOB or CP  Healing well  Saw thoracic surg for FU on 11/15;  is to now FU PRN             Family History   Problem Relation Name Age of Onset    Arthritis Mother mother     Cancer Mother mother     Arthritis Father father     Cancer Father father     Heart disease Brother brothers--3     Hypertension Brother brothers        No data recorded    No follow-ups on file.

## 2024-11-18 NOTE — Clinical Note
TSH is 0.8 3 weeks ago.  You state you want her TSH low but does not need to be undetectable.  Is low normal OK?  Or should it be between 0 and 0.3?  Hx of hypothyroid and thyroid cancer

## 2024-11-19 PROBLEM — U07.1 COVID-19: Status: ACTIVE | Noted: 2024-11-19

## 2024-11-19 PROBLEM — R09.1: Status: ACTIVE | Noted: 2024-11-19

## 2024-11-19 NOTE — ASSESSMENT & PLAN NOTE
Sx onset 11/16  Discussed risks and benefits to antivirals.  Pt declines and will continue symptomatic care

## 2024-11-19 NOTE — ASSESSMENT & PLAN NOTE
Seen Gen Surg Dr Barone 11/14/24  Presented with a incision hernia, more likely de-innervation injury, from a previous Jess Jorge incision.   Plan:  Open abdominal wall reconstruction with mesh     Seen Onc Dr Proctor 11/14/24  Not on chemo currently, no reoccurrence  Plan is if anemia continues or worsens, they will refer to hematology

## 2024-11-20 ENCOUNTER — PATIENT OUTREACH (OUTPATIENT)
Dept: PRIMARY CARE | Facility: CLINIC | Age: 64
End: 2024-11-20
Payer: COMMERCIAL

## 2024-11-20 NOTE — PROGRESS NOTES
Call regarding appt. with PCP on 11/15/24 after hospitalization.  At time of outreach call the patient  patient has now tested positive for COVID. She states she did go see thoracic surgeon and she had repeat chest xray and everything is looking good.    Reviewed the PCP appointment with the pt and addressed any questions or concerns.    Physical Therapy Daily Treatment Note        Patient: Desiree Garcia   : 1961  Diagnosis/ICD-10 Code:  Primary osteoarthritis of right shoulder [M19.011]  Referring practitioner: Sandra Kaba MD  Date of Initial Visit: Type: THERAPY  Noted: 2022  Today's Date: 2022  Patient seen for 3 sessions             Subjective   Desiree Garcia rates her Right shoulder pain at 8/10 upon arrival.    Objective     Right Shoulder PROM:  Flexion: 135 degrees -painful at end range  Abduction: 110 degrees   External rotation:  65 degrees - Pain at end range  Internal rotation:  55 degrees- Painful       See Exercise and Manual Logs for complete treatment.       Assessment/Plan     Pt tolerated therapy session moderately well - with progression of therapeutic exercises, CKC-Functional activities, and Manual therapy. She continues to have deficits in Right Shoulder ROM,  Strength,  Stability, and Pain ; limiting function and ability to perform ADLs at this time without pain and difficulty.    Progress per Plan of Care           Timed:  Manual Therapy:    10     mins  15648;  Therapeutic Exercise:    20     mins  82877;     Neuromuscular Albert:    0    mins  02951;    Therapeutic Activity:     0     mins  22662;     Gait Trainin     mins  70736;     Ultrasound:     0     mins  27293;    Electrical Stimulation:    0     mins  65131;  Iontophoresis     0     mins  81622    Untimed:  Electrical Stimulation:    0     mins  94211 ( );  Mechanical Traction:    0     mins  81902;   Fluidotherapy     0     mins  48032  Hot pack     0     mins  13684  Cold pack     0     mins  66349    Timed Treatment:   30   mins   Total Treatment:     30   mins        Ana Wan PTA  Physical Therapist Assistant

## 2024-11-26 ENCOUNTER — TELEPHONE (OUTPATIENT)
Dept: PRIMARY CARE | Facility: CLINIC | Age: 64
End: 2024-11-26
Payer: COMMERCIAL

## 2024-11-26 NOTE — TELEPHONE ENCOUNTER
Spoke with patient.  Relayed message and recommendations.  I relayed someone from the office will call tomorrow to check on her.  She is sending her  out to buy a new thermometer as well.  Patient verbally understood message.

## 2024-11-26 NOTE — TELEPHONE ENCOUNTER
Can offer her zofran to help with nausea  4mg every 8 hours prn  If she can drink fluids and BRAT diet that may help   To ER if symptoms worsen as may not be due to the Amox and may be something else causing it

## 2024-11-26 NOTE — TELEPHONE ENCOUNTER
94.2 Is mild hypothermia  Would set home setting to 82F and warm blankets on herself  If worsens go to ER

## 2024-11-26 NOTE — TELEPHONE ENCOUNTER
Patient left  stating she thought she was having reaction to Amoxicillin.  Spoke with patient. She took 2,000 mg this am for a dental appointment (she did not go because she thought she was still contagious with COVID because she tested (+) today but she was originally had it 10 days ago. I explained you can continue to test (+) for awhile but not be contagious.  Patient was continuously making sounds of dry heaving while on the phone. She said she took the medication at 8:30 this am and within 1/2 hour had stomach issues and dry heaving. She stated she started itching but it has subsided somewhat.   Please advise

## 2024-11-26 NOTE — TELEPHONE ENCOUNTER
Patient stated she has Zofran at home.   94.2 temperature and will try BRAT diet.   States she if feeling okay for now

## 2024-11-29 ENCOUNTER — PATIENT MESSAGE (OUTPATIENT)
Dept: PRIMARY CARE | Facility: CLINIC | Age: 64
End: 2024-11-29
Payer: COMMERCIAL

## 2024-11-29 DIAGNOSIS — Z86.79 HISTORY OF HYPERTENSION: ICD-10-CM

## 2024-11-29 RX ORDER — OLMESARTAN MEDOXOMIL 20 MG/1
20 TABLET ORAL DAILY
Qty: 90 TABLET | Refills: 3 | Status: SHIPPED | OUTPATIENT
Start: 2024-11-29

## 2024-11-29 NOTE — PATIENT COMMUNICATION
Spoke with patient.  Relayed message.  Formatted RX and sent to PCP.  Patient verbally understood.

## 2024-12-03 DIAGNOSIS — F41.8 ANXIETY ABOUT HEALTH: ICD-10-CM

## 2024-12-03 RX ORDER — SERTRALINE HYDROCHLORIDE 25 MG/1
25 TABLET, FILM COATED ORAL DAILY
Qty: 30 TABLET | Refills: 3 | Status: SHIPPED | OUTPATIENT
Start: 2024-12-03

## 2024-12-05 ENCOUNTER — TELEMEDICINE (OUTPATIENT)
Dept: PRIMARY CARE | Facility: CLINIC | Age: 64
End: 2024-12-05
Payer: COMMERCIAL

## 2024-12-05 VITALS — HEART RATE: 75 BPM | DIASTOLIC BLOOD PRESSURE: 85 MMHG | SYSTOLIC BLOOD PRESSURE: 168 MMHG

## 2024-12-05 DIAGNOSIS — I10 HYPERTENSION, ESSENTIAL, BENIGN: Primary | ICD-10-CM

## 2024-12-05 PROCEDURE — 3077F SYST BP >= 140 MM HG: CPT | Performed by: NURSE PRACTITIONER

## 2024-12-05 PROCEDURE — 3079F DIAST BP 80-89 MM HG: CPT | Performed by: NURSE PRACTITIONER

## 2024-12-05 PROCEDURE — 99213 OFFICE O/P EST LOW 20 MIN: CPT | Performed by: NURSE PRACTITIONER

## 2024-12-05 PROCEDURE — 1036F TOBACCO NON-USER: CPT | Performed by: NURSE PRACTITIONER

## 2024-12-05 RX ORDER — AMLODIPINE BESYLATE 5 MG/1
5 TABLET ORAL DAILY
Qty: 30 TABLET | Refills: 1 | Status: SHIPPED | OUTPATIENT
Start: 2024-12-05 | End: 2025-01-04

## 2024-12-05 ASSESSMENT — ENCOUNTER SYMPTOMS
HEADACHES: 1
ORTHOPNEA: 0
HYPERTENSION: 1
BLURRED VISION: 0
SHORTNESS OF BREATH: 0
SWEATS: 0
PND: 0
PALPITATIONS: 0

## 2024-12-05 ASSESSMENT — PATIENT HEALTH QUESTIONNAIRE - PHQ9
1. LITTLE INTEREST OR PLEASURE IN DOING THINGS: NOT AT ALL
2. FEELING DOWN, DEPRESSED OR HOPELESS: NOT AT ALL
SUM OF ALL RESPONSES TO PHQ9 QUESTIONS 1 AND 2: 0

## 2024-12-05 NOTE — ASSESSMENT & PLAN NOTE
Seemingly not controlled on benicar 40 mg every day  Will add in norvasc 5 mg HS  She will update with readings on Monday  2 wk HTN fu with NP can be VV

## 2024-12-05 NOTE — PROGRESS NOTES
An interactive audio/visual telecommunication system which permits real time communications between the patient (at the originating site) and provider (at the distant site) was utilized to provide this telehealth service.    Verbal consent was requested and obtained from the patient for this telehealth visit.  We have confirmed the patient wishes to see me, Beth Velazco, a board certified family nurse practitioner with an active [facility] license as well as verified the patient's identity and physical location in [place].    Problem List Items Addressed This Visit          Medium    Hypertension, essential, benign - Primary    Overview     11/8/21: our BP cuff  134/68  manual  hr 72      pt cuff          138/82 hr 71  12/21 Renal Artery US (-)  Adverse effects w/ACEi, stopped 1/23  On Benicar daily, Acebutatolol prn, Lasix prn           Current Assessment & Plan     Seemingly not controlled on benicar 40 mg every day  Will add in norvasc 5 mg HS  She will update with readings on Monday  2 wk HTN fu with NP can be VV         Relevant Medications    amLODIPine (Norvasc) 5 mg tablet        Subjective   Patient ID: Isabel Burroughs is a 64 y.o. female who presents for Hypertension (Had adjustment in meds BP remains high /).  Hypertension  This is a recurrent problem. The current episode started in the past 7 days. The problem is unchanged. The problem is resistant. Associated symptoms include headaches and malaise/fatigue. Pertinent negatives include no anxiety, blurred vision, chest pain, orthopnea, palpitations, peripheral edema, PND, shortness of breath or sweats. Agents associated with hypertension include NSAIDs and thyroid hormones. Risk factors for coronary artery disease include family history. There are no compliance problems.      Restarted benicar 20 mg every day  After 5 days still elevated  Increased again to benicar 40 mg every day  11/8/21: our BP cuff  134/68  manual  hr 72      pt cuff          138/82  "hr 71    Taking benicar in the morning  Checking BP when she wakes and then 3-4 x daily  First reading 160s  After a couple hours fluctuates 162-174 sys  Couple readings 159  Pulse usually mid-70s    Caffeine none daily  CAITLIN  Hydrating well      Review of Systems   Constitutional:  Positive for malaise/fatigue.   Eyes:  Negative for blurred vision.   Respiratory:  Negative for shortness of breath.    Cardiovascular:  Negative for chest pain, palpitations, orthopnea and PND.   Neurological:  Positive for headaches.   All other systems reviewed and are negative.      BP Readings from Last 3 Encounters:   12/05/24 168/85   05/24/24 115/71   03/19/24 124/70      Wt Readings from Last 3 Encounters:   05/24/24 55.8 kg (123 lb 2 oz)   05/08/24 58.1 kg (128 lb)   03/19/24 55.3 kg (122 lb)      BMI:   Estimated body mass index is 18.72 kg/m² as calculated from the following:    Height as of 5/24/24: 1.727 m (5' 8\").    Weight as of 5/24/24: 55.8 kg (123 lb 2 oz).    Objective   Physical Exam  Gen: Alert, NAD  Respiratory:  resp effort NL, speaking in complete sentences   Neuro:  coordination intact   Mood: normal    Sitting upright    "

## 2024-12-13 DIAGNOSIS — Z86.79 HISTORY OF HYPERTENSION: ICD-10-CM

## 2024-12-13 RX ORDER — OLMESARTAN MEDOXOMIL 20 MG/1
20 TABLET ORAL 2 TIMES DAILY
Qty: 60 TABLET | Refills: 1 | Status: SHIPPED | OUTPATIENT
Start: 2024-12-13

## 2024-12-19 ENCOUNTER — APPOINTMENT (OUTPATIENT)
Dept: PRIMARY CARE | Facility: CLINIC | Age: 64
End: 2024-12-19
Payer: COMMERCIAL

## 2024-12-19 DIAGNOSIS — I10 HYPERTENSION, ESSENTIAL, BENIGN: Primary | ICD-10-CM

## 2024-12-19 PROCEDURE — 1036F TOBACCO NON-USER: CPT | Performed by: NURSE PRACTITIONER

## 2024-12-19 PROCEDURE — 99213 OFFICE O/P EST LOW 20 MIN: CPT | Performed by: NURSE PRACTITIONER

## 2024-12-19 ASSESSMENT — PATIENT HEALTH QUESTIONNAIRE - PHQ9
1. LITTLE INTEREST OR PLEASURE IN DOING THINGS: NOT AT ALL
SUM OF ALL RESPONSES TO PHQ9 QUESTIONS 1 AND 2: 0
2. FEELING DOWN, DEPRESSED OR HOPELESS: NOT AT ALL

## 2024-12-19 NOTE — PROGRESS NOTES
An interactive audio/visual telecommunication system which permits real time communications between the patient (at the originating site) and provider (at the distant site) was utilized to provide this telehealth service.    Verbal consent was requested and obtained from the patient for this telehealth visit.  We have confirmed the patient wishes to see me, Beth Velazco, a board certified family nurse practitioner with an active [facility] license as well as verified the patient's identity and physical location in [place].    Problem List Items Addressed This Visit          Medium    Hypertension, essential, benign - Primary    Overview     11/8/21: our BP cuff  134/68  manual  hr 72      pt cuff          138/82 hr 71  12/21 Renal Artery US (-)  Adverse effects w/ACEi, stopped 1/23  On Benicar daily, Acebutatolol prn, Lasix prn           Current Assessment & Plan     Reviewed home readings  Some days controlled, others not  No medication changes today  Will reconnect in a week for additional readings  If remains elevated, would consider norvasc 10 mg HS (max)             Subjective   Patient ID: Isabel Burroughs is a 64 y.o. female who presents for Hypertension (At home readings-/Morning- 137/87 hr 82, 138/90 hr 80, 133/85 hr 73 , 145/88 hr 65 142/89 hr 70/Afternoon- 158/87 , 135/86 hr 82, 115/79 , 132/84 hr 80 , 143/85 hr 80).  HPI  HTN  Benicar and norvasc  Tolerating both  Checking BP    Copied from my 12/5/24 note:  Hypertension, essential, benign - Primary   Overview    11/8/21: our BP cuff  134/68  manual  hr 72      pt cuff          138/82 hr 71  12/21 Renal Artery US (-)  Adverse effects w/ACEi, stopped 1/23  On Benicar daily, Acebutatolol prn, Lasix prn          Current Assessment & Plan    Seemingly not controlled on benicar 40 mg every day  Will add in norvasc 5 mg HS  She will update with readings on Monday  2 wk HTN fu with NP can be VV            Review of Systems   All other systems reviewed and are  "negative.      BP Readings from Last 3 Encounters:   12/05/24 168/85   05/24/24 115/71   03/19/24 124/70      Wt Readings from Last 3 Encounters:   05/24/24 55.8 kg (123 lb 2 oz)   05/08/24 58.1 kg (128 lb)   03/19/24 55.3 kg (122 lb)      BMI:   Estimated body mass index is 18.72 kg/m² as calculated from the following:    Height as of 5/24/24: 1.727 m (5' 8\").    Weight as of 5/24/24: 55.8 kg (123 lb 2 oz).    Objective   Physical Exam  Gen: Alert, NAD  Respiratory:  resp effort NL, speaking in complete sentences   Neuro:  coordination intact   Mood: normal    Sitting upright   "

## 2024-12-19 NOTE — ASSESSMENT & PLAN NOTE
Reviewed home readings  Some days controlled, others not  No medication changes today  Will reconnect in a week for additional readings  If remains elevated, would consider norvasc 10 mg HS (max)

## 2024-12-20 ENCOUNTER — PATIENT OUTREACH (OUTPATIENT)
Dept: PRIMARY CARE | Facility: CLINIC | Age: 64
End: 2024-12-20
Payer: COMMERCIAL

## 2024-12-20 NOTE — PROGRESS NOTES
Unable to reach patient for 1 month call back .  LVM with call back number for patient to call if needed

## 2024-12-23 ENCOUNTER — TELEPHONE (OUTPATIENT)
Dept: PRIMARY CARE | Facility: CLINIC | Age: 64
End: 2024-12-23
Payer: COMMERCIAL

## 2024-12-23 DIAGNOSIS — F41.8 ANXIETY ABOUT HEALTH: ICD-10-CM

## 2024-12-23 RX ORDER — CLINDAMYCIN HYDROCHLORIDE 300 MG/1
600 CAPSULE ORAL
COMMUNITY
Start: 2024-12-09

## 2024-12-23 RX ORDER — SERTRALINE HYDROCHLORIDE 50 MG/1
50 TABLET, FILM COATED ORAL DAILY
Qty: 90 TABLET | Refills: 3 | Status: SHIPPED | OUTPATIENT
Start: 2024-12-23 | End: 2025-12-23

## 2024-12-23 NOTE — TELEPHONE ENCOUNTER
"Patient left a VM stating she needs a refill on her Zoloft. She stated she is taking 2 tablets.  Spoke with patient.  Relayed she just picked up the medication on 12/3/2024. She read the bottle and said ' \"Oh, when did the dose phan?\" She stated she had been taking 2- 10 mg and didn't realize the dose was changed to 25 mg.  She is out of the medication because she was taking 2 tablets daily.  Please advise    "

## 2024-12-27 DIAGNOSIS — I10 HYPERTENSION, ESSENTIAL, BENIGN: ICD-10-CM

## 2024-12-27 RX ORDER — AMLODIPINE BESYLATE 5 MG/1
5 TABLET ORAL DAILY
Qty: 90 TABLET | Refills: 3 | Status: SHIPPED | OUTPATIENT
Start: 2024-12-27

## 2025-01-11 ENCOUNTER — OFFICE VISIT (OUTPATIENT)
Dept: URGENT CARE | Age: 65
End: 2025-01-11
Payer: COMMERCIAL

## 2025-01-11 VITALS
HEIGHT: 68 IN | RESPIRATION RATE: 18 BRPM | WEIGHT: 124 LBS | TEMPERATURE: 97.6 F | HEART RATE: 82 BPM | SYSTOLIC BLOOD PRESSURE: 168 MMHG | OXYGEN SATURATION: 99 % | BODY MASS INDEX: 18.79 KG/M2 | DIASTOLIC BLOOD PRESSURE: 81 MMHG

## 2025-01-11 DIAGNOSIS — R05.9 COUGH, UNSPECIFIED TYPE: ICD-10-CM

## 2025-01-11 DIAGNOSIS — R09.81 NASAL CONGESTION: ICD-10-CM

## 2025-01-11 DIAGNOSIS — J32.9 SINOBRONCHITIS: Primary | ICD-10-CM

## 2025-01-11 DIAGNOSIS — J40 SINOBRONCHITIS: Primary | ICD-10-CM

## 2025-01-11 LAB
POC RAPID INFLUENZA A: NEGATIVE
POC RAPID INFLUENZA B: NEGATIVE
POC SARS-COV-2 AG BINAX: NORMAL

## 2025-01-11 RX ORDER — DOXYCYCLINE 100 MG/1
100 CAPSULE ORAL 2 TIMES DAILY
Qty: 20 CAPSULE | Refills: 0 | Status: SHIPPED | OUTPATIENT
Start: 2025-01-11 | End: 2025-01-21

## 2025-01-11 RX ORDER — BROMPHENIRAMINE MALEATE, PSEUDOEPHEDRINE HYDROCHLORIDE, AND DEXTROMETHORPHAN HYDROBROMIDE 2; 30; 10 MG/5ML; MG/5ML; MG/5ML
5 SYRUP ORAL 4 TIMES DAILY PRN
Qty: 120 ML | Refills: 0 | Status: SHIPPED | OUTPATIENT
Start: 2025-01-11 | End: 2025-01-21

## 2025-01-11 ASSESSMENT — ENCOUNTER SYMPTOMS
PSYCHIATRIC NEGATIVE: 1
SORE THROAT: 0
CARDIOVASCULAR NEGATIVE: 1
ALLERGIC/IMMUNOLOGIC NEGATIVE: 1
EYES NEGATIVE: 1
MUSCULOSKELETAL NEGATIVE: 1
ENDOCRINE NEGATIVE: 1
COUGH: 1
HEMATOLOGIC/LYMPHATIC NEGATIVE: 1
FEVER: 0
GASTROINTESTINAL NEGATIVE: 1
NEUROLOGICAL NEGATIVE: 1
SHORTNESS OF BREATH: 0

## 2025-01-11 NOTE — PATIENT INSTRUCTIONS
Increase clear fluids  Pcp follow up this week if not improving or worsening  ER visit anytime 24/7 for worsening or changing condition

## 2025-01-11 NOTE — PROGRESS NOTES
Subjective   Patient ID: Isabel Burroughs is a 64 y.o. female. They present today with a chief complaint of Illness, Cough, and Nasal Congestion (Cold symptoms for 1 week.).    History of Present Illness    History provided by:  Patient   used: No    Illness  Associated symptoms: congestion, cough and ear pain    Associated symptoms: no fever, no shortness of breath and no sore throat    Cough  Associated symptoms include ear pain. Pertinent negatives include no fever, sore throat or shortness of breath.   This is a 64 yr old female here for respiratory sxs. Congested cough productive of green/brown sputum, ear pain and clear/green nasal congestion x 1 week. No dyspnea, fever or sore throat. Non smoker. No hx of asthma or COPD.     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    H/O bone density study     11/18: normal     2/8/21: normal    H/O chest x-ray     2013(-)     11/21: normal    H/O chest x-ray 12/15/2023    normal    H/O chest x-ray 04/10/2024    Small bilateral pleural effusions    H/O chest x-ray 05/22/2024    There is interval improvement of mild patchy opacities in the left lower lung.  Decrease in size of left-sided pleural effusion is also noted.  There is similar blunting of the right lateral costophrenic angle.    H/O chest x-ray 07/25/2024    Minimal persistent blunting of the costophrenic angles.  No  developing abnormality    H/O chest x-ray 10/16/2024    Moderately large right pleural effusion    H/O chest x-ray 11/14/2024    Persistent right pleural effusion.  Persistent airspace disease in the right lung.  Minimal improvement    H/O CT scan     CT Calcium score 09/18:  NORMAL    H/O CT scan of abdomen 11/03/2023    5.7 CM LOBULAR ENHANCING MASS IN THE CENTRAL ASPECT OF THE LIVER WITH SUSPECTED VASCULAR INVOLVEMENT AND ABDOMINAL LYMPHADENOPATHY.  PRIMARY HEPATIC LESION CONSIDERED MOST LIKELY WITH LEADING DIFFERENTIAL CONSIDERATION OF CHOLANGIOCARCINOMA.  SUBTLE HAZY MESENTERIC DENSITY IN THE PERIPHERY OF THE ABDOMEN SUGGESTIVE OF DEVELOPING CARCINOMATOSIS    H/O CT scan of abdomen 01/12/2024    Postoperative changes of extended left hepatectomy with increased size of postoperative collection along the resection margin.  No evidence of residual tumor.  Stable heterogeneous enhancement in the inferior RIGHT lobe, probably due to poor venous drainage related to the accessory RIGHT hepatic vein visualized on preoperative imaging and either occluded or thrombosed on the current imaging.    H/O CT scan of abdomen 05/20/2024    1.  No new mass or lymphadenopathy  2.  Decreased size of a perihepatic fluid collection  3.  Decreased ascites  4.  Decreased size of a right pleural effusion, Subcentimeter lesions that are too small to characterize but  likely benign.  Left peripelvic cyst    H/O CT scan of abdomen 11/12/2024    Postoperative changes of the liver with small cystic structures.  No lymphadenopathy seen.  Inflammation with wall thickening of the ascending and proximal transverse colon new since the previous exam.  Small amounts of loculated peritoneal fluid inferior to the right lobe the liver and in the right pelvis    H/O CT scan of brain     8/20: normal    H/O CT scan of chest     12/1/22: 1.  Minimal atelectasis is present in the right middle lobe and in     the lingula. There is no abnormality noted to  explain the patient's     dyspnea    H/O CT scan of chest 11/29/2023    normal    H/O CT scan of chest 10/23/2024    Moderate to large right-sided multiloculated pleural effusion, stable in size compared to the most recent chest radiograph from 10/15/2022, substantially larger compared to the chest radiograph dated 07/24/2024.   Small locules of gas within the right pleural fluid are consistent with a hydropneumothorax.  A right Pleurx catheter remains in place. Nonspecific smooth right-sided pleural thickening    H/O CT scan of chest     (cont) New dependent densities in the right middle and right lower lobe adjacent to the pleural effusion since the prior abdominal CT from 05/16/2024 likely represent atelectasis.  3.  A now borderline enlarged right paratracheal lymph node is larger compared to the prior chest CT from 02/14/2024.  4.  Stable clustered pulmonary nodules in the lingula since the prior chest CT dated 02/14/2024    H/O Doppler ultrasound     12/21: KOBE (-)    H/O echocardiogram     11/21: Summary:     1. The resting ejection fraction was estimated at 55% with a peak exercise ejection     fraction estimated at 65 to 70%.     2. Peak HR= 148bpm which is 93% of APMHR.     3. Peak BP= 198/84; hypertensive response to exercise.     4. METS acheived= 7.     5. Average exercise capacity.     6. RVSP higher post-exercise.     7. No electrocardiographic or echocardiographic evidence    H/O magnetic resonance imaging 11/29/2023    MRI abd: Central hepatic mass, presumably a cholangiocarcinoma.  This has vascular  and biliary abutment    History of Holter monitoring     2013: PVCs, SVT runs (AT per cardiology)      Sinus rhythm / tachycardia.      Maximum heart rate was 130 bpm at 06:37.      Minimum heart rate was 64 bpm at 09:35.      The average heart rate was 70 bpm.      Rare ventricular ectopic singles.      Rare supraventricular ectopic singles, couplets,      bigeminy, and runs. Th fastest run 4 beats  156 bpm      at13:08 and longest run 13 beats 136 bpm    History of MRI of lumbar spine     8/20: Degenerative grade 1 retrolisthesis of L5 over S1 with exaggeration of      the overall lumbar lordosis.           Incidental S2/3 level Tarlov cysts.           Degenerative disc disease centered at the L5/S1 level with considerable      loss of disc height with mild endplate changes with additional multilevel      minimal endplate changes. No concerning marrow changes.      L3/4: Mild facet    History of ultrasound, pelvic     09/2018 unremarkable pelvic ultrasound       Past Surgical History:   Procedure Laterality Date    BREAST BIOPSY      12/18: Usual ductal hyperplasia and apocrine      metaplasia in a fibroelastotic stroma, suggestive of radial scar.    CHOLECYSTECTOMY  08/19/2018    Cholecystectomy    COLONOSCOPY      2015: Colonoscopy - normal - repeat in 7yrs.; Internal hemorrhoids     11/22: Diverticulosis and Hemorrhoids    COLONOSCOPY  01/07/2025    Tortuous colon.                       - Internal hemorrhoids.    DILATION AND CURETTAGE OF UTERUS      Dilation & curettage; video hysteroscopy and endometrial ablation with Novasure    ESOPHAGOGASTRODUODENOSCOPY  11/22/2022 11/22: erythematous mucosa in stomach, duodenal polyp     bx: HP    EXPLORATORY LAPAROTOMY      diagnostic laparoscopy converted to exploratory laparotomy, extended left hepatectomy, intraoperative ultrasound, and hilar lympadenectomy (12/15/2023)    FINGER SURGERY      2/12/2019 [facility] Dr. Tse. bone, trapezium, left, excision - degenerative joint disease     and focal osteonecrosis.  L thumb ligament reconstruction and tendon interposition     arthroplasty    HAND SURGERY      left [facility] joint replacement    JOINT REPLACEMENT  knee 5/23    LARYNGOSCOPY  08/19/2018    Direct Laryngoscopy    LIVER BIOPSY  11/29/2023    Adenocarcinoma involving liver parenchyma. . The overall immunophenotype would support a metastatic adenocarcinoma of  "pancreaticobiliary origin or a primary intrahepatic cholangiocarcinoma    LUNG BIOPSY  10/31/2024    Surgical Pathology: A - Pleural Cavity, Right, Fluid, Thoracentesis  Negative for malignant cells.  FINAL DIAGNOSIS  A. Lung, right chest pleura, biopsy: - Acute fibrinous and chronic fibrous pleuritis (see comment). - No tumor is seen.  B. Lung, right pleura, biopsy: - Acute fibrinous and chronic fibrous pleuritis (see comment). - No tumor is seen.    LUNG DECORTICATION  02/11/2024    s/p IR placement of R pleurx catheter 2/11/24.    THORACENTESIS  12/31/2023    PLEURAL FLUID RIGHT             Negative for malignant cells.           Acute and chronic inflammation and reactive mesothelial cells.    THYROIDECTOMY, PARTIAL      mutifocal micropapillary thyroid cancer. Left. Keep TSH low (no need for complete     suppression though)        reports that she has never smoked. She has never been exposed to tobacco smoke. She has never used smokeless tobacco. She reports that she does not currently use alcohol after a past usage of about 2.0 standard drinks of alcohol per week. She reports that she does not use drugs.    Review of Systems  Review of Systems   Constitutional:  Negative for fever.   HENT:  Positive for congestion and ear pain. Negative for sore throat.    Eyes: Negative.    Respiratory:  Positive for cough. Negative for shortness of breath.    Cardiovascular: Negative.    Gastrointestinal: Negative.    Endocrine: Negative.    Genitourinary: Negative.    Musculoskeletal: Negative.    Skin: Negative.    Allergic/Immunologic: Negative.    Neurological: Negative.    Hematological: Negative.    Psychiatric/Behavioral: Negative.     All other systems reviewed and are negative.      Objective    Vitals:    01/11/25 0853   BP: 168/81   Pulse: 82   Resp: 18   Temp: 36.4 °C (97.6 °F)   TempSrc: Oral   SpO2: 99%   Weight: 56.2 kg (124 lb)   Height: 1.727 m (5' 8\")     No LMP recorded. Patient is " postmenopausal.    Physical Exam  Vitals and nursing note reviewed.   Constitutional:       Appearance: Normal appearance.   HENT:      Head: Normocephalic and atraumatic.      Right Ear: Tympanic membrane and ear canal normal.      Left Ear: Tympanic membrane and ear canal normal.      Mouth/Throat:      Mouth: Mucous membranes are moist.      Pharynx: Oropharynx is clear.   Cardiovascular:      Rate and Rhythm: Normal rate and regular rhythm.   Pulmonary:      Effort: Pulmonary effort is normal.      Breath sounds: Normal breath sounds.   Musculoskeletal:      Cervical back: Neck supple.   Lymphadenopathy:      Cervical: No cervical adenopathy.   Skin:     General: Skin is warm and dry.   Neurological:      General: No focal deficit present.      Mental Status: She is alert and oriented to person, place, and time.   Psychiatric:         Mood and Affect: Mood normal.         Behavior: Behavior normal.         Procedures    Point of Care Test & Imaging Results from this visit  Results for orders placed or performed in visit on 01/11/25   POCT Covid-19 Rapid Antigen   Result Value Ref Range    POC ADALBERTO-COV-2 AG  Presumptive negative test for SARS-CoV-2 (no antigen detected)     Presumptive negative test for SARS-CoV-2 (no antigen detected)   POCT Influenza A/B manually resulted   Result Value Ref Range    POC Rapid Influenza A Negative Negative    POC Rapid Influenza B Negative Negative      No results found.    Diagnostic study results (if any) were reviewed by Hamida Goodwin PA-C.    Assessment/Plan   Allergies, medications, history, and pertinent labs/EKGs/Imaging reviewed by Hamida Goodwin PA-C.     Orders and Diagnoses  Diagnoses and all orders for this visit:  Sinobronchitis  -     doxycycline (Monodox) 100 mg capsule; Take 1 capsule (100 mg) by mouth 2 times a day for 10 days. Take with at least 8 ounces (large glass) of water, do not lie down for 30 minutes after  -     brompheniramine-pseudoeph-DM  2-30-10 mg/5 mL syrup; Take 5 mL by mouth 4 times a day as needed for cough or congestion for up to 10 days.  Cough, unspecified type  -     POCT Covid-19 Rapid Antigen  -     POCT Influenza A/B manually resulted  Nasal congestion  -     POCT Covid-19 Rapid Antigen  -     POCT Influenza A/B manually resulted    Plan:  Meds as above  Increase clear fluids  Pcp follow up this week if not improving or worsening  ER visit anytime 24/7 for acute worsening or changing condition      Patient disposition: Home    Electronically signed by Hamida Goodwin PA-C  9:15 AM

## 2025-01-16 ENCOUNTER — PATIENT OUTREACH (OUTPATIENT)
Dept: PRIMARY CARE | Facility: CLINIC | Age: 65
End: 2025-01-16
Payer: COMMERCIAL

## 2025-02-12 ENCOUNTER — APPOINTMENT (OUTPATIENT)
Dept: PRIMARY CARE | Facility: CLINIC | Age: 65
End: 2025-02-12
Payer: COMMERCIAL

## 2025-04-08 ENCOUNTER — TELEPHONE (OUTPATIENT)
Dept: PRIMARY CARE | Facility: CLINIC | Age: 65
End: 2025-04-08
Payer: COMMERCIAL

## 2025-04-08 DIAGNOSIS — I10 HYPERTENSION, ESSENTIAL, BENIGN: ICD-10-CM

## 2025-04-08 RX ORDER — AMLODIPINE BESYLATE 5 MG/1
5 TABLET ORAL 2 TIMES DAILY
Qty: 180 TABLET | Refills: 3 | Status: SHIPPED | OUTPATIENT
Start: 2025-04-08

## 2025-04-08 NOTE — TELEPHONE ENCOUNTER
Left detailed VM relaying the following message:   Sent RX to take BID   If /70s and feels fine on this dose can continue  If BP too low or feels LH/dizzy would reduce to one daily

## 2025-04-08 NOTE — TELEPHONE ENCOUNTER
Patient left a VM stating she needs a refill on her amlodipine 5 mg. Patient stated she takes it BID but the last note from DG stated they would revisit the increase if Bps were still high. There is no follow up to that in the notes.  The dose has always been 1 daily.   Please advise.

## 2025-04-16 ENCOUNTER — TELEMEDICINE (OUTPATIENT)
Dept: PRIMARY CARE | Facility: CLINIC | Age: 65
End: 2025-04-16
Payer: COMMERCIAL

## 2025-04-16 VITALS — DIASTOLIC BLOOD PRESSURE: 80 MMHG | SYSTOLIC BLOOD PRESSURE: 137 MMHG

## 2025-04-16 DIAGNOSIS — T45.1X5A NEUROPATHY DUE TO CHEMOTHERAPEUTIC DRUG (MULTI): ICD-10-CM

## 2025-04-16 DIAGNOSIS — J96.02 ACUTE RESPIRATORY FAILURE WITH HYPERCAPNIA: ICD-10-CM

## 2025-04-16 DIAGNOSIS — Z13.820 SCREENING FOR OSTEOPOROSIS: ICD-10-CM

## 2025-04-16 DIAGNOSIS — Z78.0 MENOPAUSE: ICD-10-CM

## 2025-04-16 DIAGNOSIS — I10 HYPERTENSION, ESSENTIAL, BENIGN: Primary | ICD-10-CM

## 2025-04-16 DIAGNOSIS — Z12.31 ENCOUNTER FOR SCREENING MAMMOGRAM FOR BREAST CANCER: ICD-10-CM

## 2025-04-16 DIAGNOSIS — J96.01 ACUTE RESPIRATORY FAILURE WITH HYPOXIA: ICD-10-CM

## 2025-04-16 DIAGNOSIS — J96.00 ACUTE RESPIRATORY FAILURE, UNSPECIFIED WHETHER WITH HYPOXIA OR HYPERCAPNIA: ICD-10-CM

## 2025-04-16 DIAGNOSIS — G62.0 NEUROPATHY DUE TO CHEMOTHERAPEUTIC DRUG (MULTI): ICD-10-CM

## 2025-04-16 DIAGNOSIS — M25.50 ARTHRALGIA, UNSPECIFIED JOINT: ICD-10-CM

## 2025-04-16 DIAGNOSIS — I47.10 PAROXYSMAL SUPRAVENTRICULAR TACHYCARDIA (CMS-HCC): ICD-10-CM

## 2025-04-16 PROCEDURE — 99213 OFFICE O/P EST LOW 20 MIN: CPT | Performed by: NURSE PRACTITIONER

## 2025-04-16 PROCEDURE — 3079F DIAST BP 80-89 MM HG: CPT | Performed by: NURSE PRACTITIONER

## 2025-04-16 PROCEDURE — 3075F SYST BP GE 130 - 139MM HG: CPT | Performed by: NURSE PRACTITIONER

## 2025-04-16 RX ORDER — DOCUSATE SODIUM 100 MG/1
100 CAPSULE, LIQUID FILLED ORAL EVERY 12 HOURS PRN
COMMUNITY
Start: 2025-02-22 | End: 2025-04-16 | Stop reason: ALTCHOICE

## 2025-04-16 RX ORDER — MELOXICAM 15 MG/1
15 TABLET ORAL
COMMUNITY
Start: 2025-03-31 | End: 2025-04-16 | Stop reason: ALTCHOICE

## 2025-04-16 NOTE — PROGRESS NOTES
An interactive audio/visual  telecommunication system which permits real time communications between the patient (at the originating site) and provider (at the distant site) was utilized to provide this telehealth service.    Verbal consent was requested and obtained from the patient for this telehealth visit.  We have confirmed the patient wishes to see me, Beth Velazco, a board certified nurse practitioner with an active [place] advanced practice license as well as verified the patient's identity and physical location in [place].    Problem List Items Addressed This Visit          Medium    RESOLVED: Acute respiratory failure with hypercapnia    RESOLVED: Acute respiratory failure with hypoxia    RESOLVED: Acute respiratory failure, unspecified whether with hypoxia or hypercapnia    Encounter for screening mammogram for breast cancer    Relevant Orders    BI mammo bilateral screening tomosynthesis    Hypertension, essential, benign - Primary    Overview   11/8/21: our BP cuff  134/68  manual  hr 72      pt cuff          138/82 hr 71  12/21 Renal Artery US (-)  Adverse effects w/ACEi, stopped 1/23  S/p benicar dc 12/2024   On norvasc, Acebutatolol prn, Lasix prn           Current Assessment & Plan   She wonders if norvasc is underlying joint pain  She would like to see if pain progresses with meloxicam  - if so, can try decreasing norvasc back to every day and monitor BP  - if norvasc found to be underlying, would need alternate anti-hypertensive         Menopause    Relevant Orders    XR DEXA bone density    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       Maximum heart rate was 130 bpm at 06:37.       Minimum heart rate was 64 bpm at 09:35.       The average heart rate was 70 bpm.       Rare ventricular ectopic singles.       Rare  supraventricular ectopic singles, couplets,       bigeminy, and runs. Th fastest run 4 beats 156 bpm       at13:08 and longest run 13 beats 136 bpm at 17:51.       Patient's diary at 10:58 on 9/11/13 did correlate with       ecg signal with ventricular ectopic singles.       Holter 8/22: NSR, average HR 69 (min 52, max 137)      AFIB/AFLUTTER 0%      SVE: BUrden 0.28% max count/24 hr was 275      SVT: 25 events. longest 14 beats; fastest 170      Pause: ) events      AVB; 0%      PVC 0.01% max/24h= 11      VT: 0 events      Pt recorded no events      s/p ER consult 11/22: 2 soft indications for treatment of her ectopy, namely to decrease      her symptoms and to decrease the risk of future atrial fibrillation.      acetabutol prn prescribed          Other Visit Diagnoses         Arthralgia, unspecified joint        upper ext  she thinks maybe norvasc  ok to try dec from bid to qd  if this helps would need new antihypertensive      Screening for osteoporosis        Relevant Orders    XR DEXA bone density             Subjective   Patient ID: Isabel Burroughs is a 64 y.o. female who presents for BP and joint pain (has questions about BP and joint pain/Having more joint pain recently has read that amlodipine can cause jiont pain asking if should switch med).  HPI  Joint pain x couple months   Hands, elbows, fingers  No new vits, herbals, supps or teas  Meloxicam wo relief  - advil more effective  - was using for her knee  ? If norvasc underlying  5 mg bid  Benicar dc'd Dec 2024   134-138/80  Not needing BB prn    Review of Systems   All other systems reviewed and are negative.      BP Readings from Last 3 Encounters:   04/16/25 137/80   01/11/25 168/81   12/05/24 168/85      Wt Readings from Last 3 Encounters:   01/11/25 56.2 kg (124 lb)   05/24/24 55.8 kg (123 lb 2 oz)   05/08/24 58.1 kg (128 lb)      BMI:   Estimated body mass index is 18.85 kg/m² as calculated from the following:    Height as of 1/11/25: 1.727 m  "(5' 8\").    Weight as of 1/11/25: 56.2 kg (124 lb).    Objective   Physical Exam  Gen: Alert, NAD  Respiratory:  resp effort NL, speaking in complete sentences   Neuro:  coordination intact   Mood: normal    Sitting upright        "

## 2025-04-17 PROBLEM — J96.00 ACUTE RESPIRATORY FAILURE, UNSPECIFIED WHETHER WITH HYPOXIA OR HYPERCAPNIA: Status: ACTIVE | Noted: 2025-04-17

## 2025-04-17 PROBLEM — J96.01 ACUTE RESPIRATORY FAILURE WITH HYPOXIA: Status: RESOLVED | Noted: 2025-04-17 | Resolved: 2025-04-17

## 2025-04-17 PROBLEM — J96.01 ACUTE RESPIRATORY FAILURE WITH HYPOXIA: Status: ACTIVE | Noted: 2025-04-17

## 2025-04-17 PROBLEM — J96.02 ACUTE RESPIRATORY FAILURE WITH HYPERCAPNIA: Status: ACTIVE | Noted: 2025-04-17

## 2025-04-17 PROBLEM — J96.00 ACUTE RESPIRATORY FAILURE, UNSPECIFIED WHETHER WITH HYPOXIA OR HYPERCAPNIA: Status: RESOLVED | Noted: 2025-04-17 | Resolved: 2025-04-17

## 2025-04-17 PROBLEM — J96.02 ACUTE RESPIRATORY FAILURE WITH HYPERCAPNIA: Status: RESOLVED | Noted: 2025-04-17 | Resolved: 2025-04-17

## 2025-04-17 NOTE — ASSESSMENT & PLAN NOTE
She wonders if norvasc is underlying joint pain  She would like to see if pain progresses with meloxicam  - if so, can try decreasing norvasc back to every day and monitor BP  - if norvasc found to be underlying, would need alternate anti-hypertensive

## 2025-04-18 ENCOUNTER — APPOINTMENT (OUTPATIENT)
Dept: PRIMARY CARE | Facility: CLINIC | Age: 65
End: 2025-04-18
Payer: COMMERCIAL

## 2025-05-19 ENCOUNTER — HOSPITAL ENCOUNTER (OUTPATIENT)
Dept: RADIOLOGY | Facility: CLINIC | Age: 65
Discharge: HOME | End: 2025-05-19
Payer: COMMERCIAL

## 2025-05-19 VITALS — BODY MASS INDEX: 19.7 KG/M2 | WEIGHT: 130 LBS | HEIGHT: 68 IN

## 2025-05-19 DIAGNOSIS — Z13.820 SCREENING FOR OSTEOPOROSIS: ICD-10-CM

## 2025-05-19 DIAGNOSIS — Z78.0 MENOPAUSE: ICD-10-CM

## 2025-05-19 DIAGNOSIS — Z12.31 ENCOUNTER FOR SCREENING MAMMOGRAM FOR BREAST CANCER: ICD-10-CM

## 2025-05-19 PROCEDURE — 77067 SCR MAMMO BI INCL CAD: CPT | Performed by: RADIOLOGY

## 2025-05-19 PROCEDURE — 77067 SCR MAMMO BI INCL CAD: CPT

## 2025-05-19 PROCEDURE — 77063 BREAST TOMOSYNTHESIS BI: CPT | Performed by: RADIOLOGY

## 2025-05-23 DIAGNOSIS — E03.9 HYPOTHYROIDISM, ADULT: ICD-10-CM

## 2025-05-23 RX ORDER — LEVOTHYROXINE SODIUM 137 UG/1
137 TABLET ORAL DAILY
Qty: 30 TABLET | Refills: 11 | Status: SHIPPED | OUTPATIENT
Start: 2025-05-23 | End: 2026-05-23

## 2025-05-30 DIAGNOSIS — Z00.00 ROUTINE HEALTH MAINTENANCE: ICD-10-CM

## 2025-06-01 LAB
25(OH)D3+25(OH)D2 SERPL-MCNC: 42 NG/ML (ref 30–100)
ALBUMIN SERPL-MCNC: 4.5 G/DL (ref 3.6–5.1)
ALP SERPL-CCNC: 167 U/L (ref 37–153)
ALT SERPL-CCNC: 32 U/L (ref 6–29)
ANION GAP SERPL CALCULATED.4IONS-SCNC: 7 MMOL/L (CALC) (ref 7–17)
APPEARANCE UR: CLEAR
AST SERPL-CCNC: 27 U/L (ref 10–35)
BACTERIA #/AREA URNS HPF: ABNORMAL /HPF
BASOPHILS # BLD AUTO: 29 CELLS/UL (ref 0–200)
BASOPHILS NFR BLD AUTO: 0.5 %
BILIRUB SERPL-MCNC: 0.9 MG/DL (ref 0.2–1.2)
BILIRUB UR QL STRIP: NEGATIVE
BUN SERPL-MCNC: 22 MG/DL (ref 7–25)
CALCIUM SERPL-MCNC: 9 MG/DL (ref 8.6–10.4)
CHLORIDE SERPL-SCNC: 104 MMOL/L (ref 98–110)
CHOLEST SERPL-MCNC: 171 MG/DL
CHOLEST/HDLC SERPL: 2.3 (CALC)
CO2 SERPL-SCNC: 29 MMOL/L (ref 20–32)
COLOR UR: YELLOW
CREAT SERPL-MCNC: 0.85 MG/DL (ref 0.5–1.05)
EGFRCR SERPLBLD CKD-EPI 2021: 76 ML/MIN/1.73M2
EOSINOPHIL # BLD AUTO: 197 CELLS/UL (ref 15–500)
EOSINOPHIL NFR BLD AUTO: 3.4 %
ERYTHROCYTE [DISTWIDTH] IN BLOOD BY AUTOMATED COUNT: 12.9 % (ref 11–15)
GLUCOSE SERPL-MCNC: 90 MG/DL (ref 65–99)
GLUCOSE UR QL STRIP: NEGATIVE
HCT VFR BLD AUTO: 37.2 % (ref 35–45)
HDLC SERPL-MCNC: 74 MG/DL
HGB BLD-MCNC: 11.8 G/DL (ref 11.7–15.5)
HGB UR QL STRIP: NEGATIVE
HYALINE CASTS #/AREA URNS LPF: ABNORMAL /LPF
KETONES UR QL STRIP: NEGATIVE
LDLC SERPL CALC-MCNC: 82 MG/DL (CALC)
LEUKOCYTE ESTERASE UR QL STRIP: ABNORMAL
LYMPHOCYTES # BLD AUTO: 1879 CELLS/UL (ref 850–3900)
LYMPHOCYTES NFR BLD AUTO: 32.4 %
MCH RBC QN AUTO: 30.3 PG (ref 27–33)
MCHC RBC AUTO-ENTMCNC: 31.7 G/DL (ref 32–36)
MCV RBC AUTO: 95.6 FL (ref 80–100)
MONOCYTES # BLD AUTO: 499 CELLS/UL (ref 200–950)
MONOCYTES NFR BLD AUTO: 8.6 %
NEUTROPHILS # BLD AUTO: 3196 CELLS/UL (ref 1500–7800)
NEUTROPHILS NFR BLD AUTO: 55.1 %
NITRITE UR QL STRIP: NEGATIVE
NONHDLC SERPL-MCNC: 97 MG/DL (CALC)
PH UR STRIP: 5.5 [PH] (ref 5–8)
PLATELET # BLD AUTO: 306 THOUSAND/UL (ref 140–400)
PMV BLD REES-ECKER: 9.4 FL (ref 7.5–12.5)
POTASSIUM SERPL-SCNC: 4.1 MMOL/L (ref 3.5–5.3)
PROT SERPL-MCNC: 6.5 G/DL (ref 6.1–8.1)
PROT UR QL STRIP: NEGATIVE
RBC # BLD AUTO: 3.89 MILLION/UL (ref 3.8–5.1)
RBC #/AREA URNS HPF: ABNORMAL /HPF
SERVICE CMNT-IMP: ABNORMAL
SODIUM SERPL-SCNC: 140 MMOL/L (ref 135–146)
SP GR UR STRIP: 1.01 (ref 1–1.03)
SQUAMOUS #/AREA URNS HPF: ABNORMAL /HPF
T4 FREE SERPL-MCNC: 1.8 NG/DL (ref 0.8–1.8)
TRIGL SERPL-MCNC: 73 MG/DL
TSH SERPL-ACNC: 0.04 MIU/L (ref 0.4–4.5)
WBC # BLD AUTO: 5.8 THOUSAND/UL (ref 3.8–10.8)
WBC #/AREA URNS HPF: ABNORMAL /HPF

## 2025-06-01 ASSESSMENT — PROMIS GLOBAL HEALTH SCALE
RATE_MENTAL_HEALTH: VERY GOOD
RATE_GENERAL_HEALTH: GOOD
RATE_QUALITY_OF_LIFE: GOOD
CARRYOUT_SOCIAL_ACTIVITIES: VERY GOOD
RATE_SOCIAL_SATISFACTION: GOOD
CARRYOUT_PHYSICAL_ACTIVITIES: MODERATELY
RATE_PHYSICAL_HEALTH: GOOD
EMOTIONAL_PROBLEMS: RARELY
RATE_AVERAGE_PAIN: 5
RATE_AVERAGE_FATIGUE: MILD

## 2025-06-02 ENCOUNTER — APPOINTMENT (OUTPATIENT)
Dept: PRIMARY CARE | Facility: CLINIC | Age: 65
End: 2025-06-02
Payer: COMMERCIAL

## 2025-06-02 VITALS
BODY MASS INDEX: 19.4 KG/M2 | DIASTOLIC BLOOD PRESSURE: 62 MMHG | OXYGEN SATURATION: 96 % | HEIGHT: 68 IN | SYSTOLIC BLOOD PRESSURE: 111 MMHG | WEIGHT: 128 LBS | HEART RATE: 72 BPM | TEMPERATURE: 96.5 F

## 2025-06-02 DIAGNOSIS — Z00.00 ROUTINE ADULT HEALTH MAINTENANCE: ICD-10-CM

## 2025-06-02 DIAGNOSIS — Z12.31 ENCOUNTER FOR SCREENING MAMMOGRAM FOR BREAST CANCER: ICD-10-CM

## 2025-06-02 DIAGNOSIS — E53.8 VITAMIN B12 DEFICIENCY: ICD-10-CM

## 2025-06-02 DIAGNOSIS — Z86.2 HISTORY OF ANEMIA: ICD-10-CM

## 2025-06-02 DIAGNOSIS — Z78.0 MENOPAUSE: ICD-10-CM

## 2025-06-02 DIAGNOSIS — E03.9 HYPOTHYROIDISM, ADULT: ICD-10-CM

## 2025-06-02 DIAGNOSIS — E55.9 VITAMIN D DEFICIENCY: ICD-10-CM

## 2025-06-02 DIAGNOSIS — C22.1 CHOLANGIOCARCINOMA (MULTI): ICD-10-CM

## 2025-06-02 DIAGNOSIS — G62.0 NEUROPATHY DUE TO CHEMOTHERAPEUTIC DRUG (MULTI): ICD-10-CM

## 2025-06-02 DIAGNOSIS — Z00.00 ROUTINE HEALTH MAINTENANCE: Primary | ICD-10-CM

## 2025-06-02 DIAGNOSIS — M15.0 PRIMARY OSTEOARTHRITIS INVOLVING MULTIPLE JOINTS: ICD-10-CM

## 2025-06-02 DIAGNOSIS — Z85.850 HISTORY OF THYROID CANCER: ICD-10-CM

## 2025-06-02 DIAGNOSIS — M47.816 LUMBAR SPONDYLOSIS: ICD-10-CM

## 2025-06-02 DIAGNOSIS — I10 HYPERTENSION, ESSENTIAL, BENIGN: ICD-10-CM

## 2025-06-02 DIAGNOSIS — T45.1X5A NEUROPATHY DUE TO CHEMOTHERAPEUTIC DRUG (MULTI): ICD-10-CM

## 2025-06-02 DIAGNOSIS — M51.9 LUMBAR DISC DISEASE: ICD-10-CM

## 2025-06-02 DIAGNOSIS — Z13.820 SCREENING FOR OSTEOPOROSIS: ICD-10-CM

## 2025-06-02 DIAGNOSIS — M54.2 CERVICAL PAIN (NECK): ICD-10-CM

## 2025-06-02 DIAGNOSIS — F41.8 ANXIETY ABOUT HEALTH: ICD-10-CM

## 2025-06-02 PROBLEM — Z87.09: Status: ACTIVE | Noted: 2024-11-19

## 2025-06-02 PROBLEM — Z87.09 HISTORY OF PLEURAL EFFUSION: Status: ACTIVE | Noted: 2024-02-24

## 2025-06-02 PROBLEM — Z87.19 HISTORY OF COLITIS: Status: ACTIVE | Noted: 2025-06-02

## 2025-06-02 PROBLEM — Z87.09: Status: RESOLVED | Noted: 2024-11-19 | Resolved: 2025-06-02

## 2025-06-02 PROBLEM — U07.1 COVID-19: Status: RESOLVED | Noted: 2024-11-19 | Resolved: 2025-06-02

## 2025-06-02 PROCEDURE — 99396 PREV VISIT EST AGE 40-64: CPT | Performed by: INTERNAL MEDICINE

## 2025-06-02 PROCEDURE — 3008F BODY MASS INDEX DOCD: CPT | Performed by: INTERNAL MEDICINE

## 2025-06-02 PROCEDURE — 99214 OFFICE O/P EST MOD 30 MIN: CPT | Performed by: INTERNAL MEDICINE

## 2025-06-02 PROCEDURE — 1036F TOBACCO NON-USER: CPT | Performed by: INTERNAL MEDICINE

## 2025-06-02 PROCEDURE — 3078F DIAST BP <80 MM HG: CPT | Performed by: INTERNAL MEDICINE

## 2025-06-02 PROCEDURE — 3074F SYST BP LT 130 MM HG: CPT | Performed by: INTERNAL MEDICINE

## 2025-06-02 RX ORDER — SERTRALINE HYDROCHLORIDE 50 MG/1
50 TABLET, FILM COATED ORAL DAILY
Qty: 90 TABLET | Refills: 3 | Status: CANCELLED | OUTPATIENT
Start: 2025-06-02 | End: 2026-06-02

## 2025-06-02 RX ORDER — LEVOTHYROXINE SODIUM 137 UG/1
137 TABLET ORAL DAILY
Qty: 30 TABLET | Refills: 11 | Status: SHIPPED | OUTPATIENT
Start: 2025-06-02 | End: 2026-06-02

## 2025-06-02 RX ORDER — GABAPENTIN 300 MG/1
300 CAPSULE ORAL NIGHTLY
Qty: 90 CAPSULE | Refills: 3 | Status: SHIPPED | OUTPATIENT
Start: 2025-06-02

## 2025-06-02 RX ORDER — AMLODIPINE BESYLATE 5 MG/1
5 TABLET ORAL 2 TIMES DAILY
Qty: 180 TABLET | Refills: 3 | Status: SHIPPED | OUTPATIENT
Start: 2025-06-02

## 2025-06-02 RX ORDER — SERTRALINE HYDROCHLORIDE 50 MG/1
25 TABLET, FILM COATED ORAL DAILY
Start: 2025-06-02 | End: 2026-06-02

## 2025-06-02 RX ORDER — ACEBUTOLOL HYDROCHLORIDE 200 MG/1
200 CAPSULE ORAL AS NEEDED
Qty: 10 CAPSULE | Refills: 0 | Status: SHIPPED | OUTPATIENT
Start: 2025-06-02

## 2025-06-02 NOTE — ASSESSMENT & PLAN NOTE
Annual Wellness exam completed   Preventive Health History reviewed  Ordered:   Labs    Mammogram   BMD   PCV 20

## 2025-06-02 NOTE — PROGRESS NOTES
ANNUAL CPE VISIT  Chief Complaint   Patient presents with    Annual Exam     HPI: Reviewed chart/medical care received since last seen by me.    2/25: 1. Open right myofascial advancement flap.  2. Open left myofascial advancement flap.  3. Repair of incarcerated incisional hernia.   4. Implantation of 30x30 cm of Prolene mesh.   5. Resection of skin and subcutaneous tissue.    11/24: uncomplicated R VATS decortication and removal of a pleurX catheter for a fibrothorax     Saw DG in 4/25 (copied)  She wonders if norvasc is underlying joint pain  She would like to see if pain progresses with(out) meloxicam  - if so, can try decreasing norvasc back to every day and monitor BP  - if norvasc found to be underlying, would need alternate anti-hypertensive    CT: 1.  No new mass or lymphadenopathy in the abdomen and pelvis   2.  Decreased fluid along the right lobe of the liver   3.  Resolution of previously described thickening of the wall the colon     Labs reviewed:  Component      Latest Ref Rng 5/31/2025   WHITE BLOOD CELL COUNT      3.8 - 10.8 Thousand/uL 5.8    RED BLOOD CELL COUNT      3.80 - 5.10 Million/uL 3.89    HEMOGLOBIN      11.7 - 15.5 g/dL 11.8    HEMATOCRIT      35.0 - 45.0 % 37.2    MCV      80.0 - 100.0 fL 95.6    MCH      27.0 - 33.0 pg 30.3    MCHC      32.0 - 36.0 g/dL 31.7 (L)    RDW      11.0 - 15.0 % 12.9    PLATELET COUNT      140 - 400 Thousand/uL 306    MPV      7.5 - 12.5 fL 9.4    ABSOLUTE NEUTROPHILS      1,500 - 7,800 cells/uL 3,196    ABSOLUTE LYMPHOCYTES      850 - 3,900 cells/uL 1,879    ABSOLUTE MONOCYTES      200 - 950 cells/uL 499    ABSOLUTE EOSINOPHILS      15 - 500 cells/uL 197    ABSOLUTE BASOPHILS      0 - 200 cells/uL 29    NEUTROPHILS      % 55.1    LYMPHOCYTES      % 32.4    MONOCYTES      % 8.6    EOSINOPHILS      % 3.4    BASOPHILS      % 0.5    COLOR      YELLOW  YELLOW    APPEARANCE      CLEAR  CLEAR    SPECIFIC GRAVITY      1.001 - 1.035  1.012    PH      5.0 - 8.0  5.5     GLUCOSE      NEGATIVE  NEGATIVE    BILIRUBIN      NEGATIVE  NEGATIVE    KETONES      NEGATIVE  NEGATIVE    OCCULT BLOOD      NEGATIVE  NEGATIVE    PROTEIN      NEGATIVE  NEGATIVE    NITRITE      NEGATIVE  NEGATIVE    LEUKOCYTE ESTERASE      NEGATIVE  TRACE !    WBC      < OR = 5 /HPF 0-5    RBC      < OR = 2 /HPF NONE SEEN    SQUAMOUS EPITHELIAL CELLS      < OR = 5 /HPF NONE SEEN    BACTERIA      NONE SEEN /HPF NONE SEEN    HYALINE CAST      NONE SEEN /LPF NONE SEEN    NOTE --    GLUCOSE      65 - 99 mg/dL 90    UREA NITROGEN (BUN)      7 - 25 mg/dL 22    CREATININE      0.50 - 1.05 mg/dL 0.85    EGFR      > OR = 60 mL/min/1.73m2 76    SODIUM      135 - 146 mmol/L 140    POTASSIUM      3.5 - 5.3 mmol/L 4.1    CHLORIDE      98 - 110 mmol/L 104    CARBON DIOXIDE      20 - 32 mmol/L 29    ELECTROLYTE BALANCE      7 - 17 mmol/L (calc) 7    CALCIUM      8.6 - 10.4 mg/dL 9.0    PROTEIN, TOTAL      6.1 - 8.1 g/dL 6.5    ALBUMIN      3.6 - 5.1 g/dL 4.5    BILIRUBIN, TOTAL      0.2 - 1.2 mg/dL 0.9    ALKALINE PHOSPHATASE      37 - 153 U/L 167 (H)    AST      10 - 35 U/L 27    ALT      6 - 29 U/L 32 (H)    CHOLESTEROL, TOTAL      <200 mg/dL 171    HDL CHOLESTEROL      > OR = 50 mg/dL 74    TRIGLYCERIDES      <150 mg/dL 73    LDL-CHOLESTEROL      mg/dL (calc) 82    CHOL/HDLC RATIO      <5.0 (calc) 2.3    NON HDL CHOLESTEROL      <130 mg/dL (calc) 97    TSH      0.40 - 4.50 mIU/L 0.04 (L)    T4, FREE      0.8 - 1.8 ng/dL 1.8    VITAMIN D,25-OH,TOTAL,IA      30 - 100 ng/mL 42     feels fine  No thyroid symptoms    Alkaline Phosphatase  34 - 123 U/L 279 High    AST  13 - 35 U/L 37 High      C/o neck pain  Has been going to PT  Mobic didn't help    Assessment and Plan:  Problem List Items Addressed This Visit          High    Routine health maintenance - Primary    Overview       Influenza 8/24/2015, 2020, 10/25/21, 10/1/22. 9/21/23      Pfizer COVID 19 vaccine 3/6/21, 3/27/21, 12/15/21         RSV 12/1/23      TD  Adult4/27/2004, 10/13/2020      Tdap (Age 7+)2/4/2011, 12/1/23       Zostavax 8/24/2015      Shingrix: 10/2020: 12/2020       PAP/HPV 2014 (-), 9/14/18 (-); 11/15/22 (-)      Hep C Ab (-) 5/28/15      Mamm 11/8/18; 12/19/19, 12/24/20, 12/29/22; 5/10/2024; 5/19/25       BMD 11/8/18, 2/8/21      Cscope 2015 (7yrs); 11/22/22 (7-10 yrs); 1/6/25 (5yrs on report, pt thinks is 2yrs)         Current Assessment & Plan   Annual Wellness exam completed   Preventive Health History reviewed  Ordered:   Labs    Mammogram   BMD   PCV 20         Relevant Orders    Measles (Rubeola) Antibody, IgG       Medium    Anxiety about health    Overview   Lexapro wo relief  Has xanax prn  9/12/24: increase zoloft 25 mg every day to 37.5 mg every day; order Gene Sight            Current Assessment & Plan   Wean off if able         Relevant Medications    sertraline (Zoloft) 50 mg tablet    Cholangiocarcinoma (Multi)    Overview   Centrally located intrahepatic cholangiocarinoma. Resected.  Stage IB (pT1b, pN0, cM0)   DPYD and UGT1A1: Intermediate metabolizer for both.   GENOMIC PROFILE (CARIS): FGFR2 mutation, KMT2C, MYLES. TMB 2 mut/Mb.  Incidentally found 5.8cm intrahepatic cholangiocarcinoma, discovered by clinical trial at [facility]. (Had positive screen noted on her Grail test for: Liver, Bile duct cancer and Pancreas, Gallbladder cancer)  Liver biopsy 11/23: Adenocarcinoma involving liver parenchyma.   12/23 PET SCAN: 5.7 cm central hepatic moderately avid cholangiocarcinoma, No FDG avid lymphadenopathy , no evidence of metastatic disease.   S/p diagnostic laparoscopy converted to exploratory laparotomy, extended left hepatectomy, intraoperative ultrasound, and hilar lympadenectomy (12/15/2023)   Complicated by recurrent post-op pleural effusion  s/p IR placement of R pleurx catheter 2/11/24. Then decortcation surgery 11/24  S/P adjuvant capecitabine 1000 thru Fall 2024  Sees Oncology [facility]  CT every 6 months and labs [id number]           Encounter  for screening mammogram for breast cancer    Relevant Orders    BI mammo bilateral screening tomosynthesis    History of anemia    Overview   Hematology consulted in January 2020.  Normocytic anemia for many years and has been relatively stable.    On B12 supplement (level was low at 236 in 10/20), parenteral x 3months, switched to oral 1/21  Hb 11.3 in 2015  In 5/24 due to capecitabine 1000 mg BID and also iron deficient  Started on iron         Relevant Orders    Iron and TIBC    Ferritin    History of thyroid cancer    Overview   Occult papillary Carcinoma;mutifocal micropapillary thyroid cancer.   Follicular Adenoma;mutifocal micropapillary thyroid cancer.  Keep TSH low, no need for complete suppression though         Hypertension, essential, benign    Overview   11/8/21: our BP cuff  134/68  manual  hr 72      pt cuff          138/82 hr 71  12/21 Renal Artery US (-)  Adverse effects w/ACEi, stopped 1/23  S/p benicar dc 12/2024   On norvasc, Acebutatolol prn, Lasix prn           Relevant Medications    amLODIPine (Norvasc) 5 mg tablet    acebutolol (Sectral) 200 mg capsule    Other Relevant Orders    Comprehensive Metabolic Panel    CBC and Auto Differential    Lipid Panel    Urinalysis with Reflex Microscopic    Albumin-Creatinine Ratio, Urine Random    Hypothyroidism, adult    Overview   Goal TSH low, but doesnt have to be undetectable      On synthroid         Relevant Medications    levothyroxine (Synthroid, Levoxyl) 137 mcg tablet    Other Relevant Orders    Tsh With Reflex To Free T4 If Abnormal    TSH with reflex to Free T4 if abnormal    Lumbar disc disease    Overview   MRI 8/20: Degenerative grade 1 retrolisthesis of L5 over S1 with exaggeration of the overall lumbar lordosis. Incidental S2/3 level Tarlov cysts. Degenerative disc disease centered at the L5/S1 level with considerable loss of disc height with mild endplate changes with additional multilevel minimal endplate changes. No concerning marrow  "changes. L3/4: Mild facet   Managed by Spine      On gabapentin       s/p blocks         Relevant Orders    MR lumbar spine wo IV contrast    Disability Placard    Lumbar spondylosis    Overview   MRI 8/20: Degenerative grade 1 retrolisthesis of L5 over S1 with exaggeration of the overall lumbar lordosis. Incidental S2/3 level Tarlov cysts. Degenerative disc disease centered at the L5/S1 level with considerable loss of disc height with mild endplate changes with additional multilevel minimal endplate changes. No concerning marrow changes. 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)    Overview   On kailey HS         Screening for osteoporosis    Relevant Orders    XR DEXA bone density    Vitamin B12 deficiency    Overview   B12 236 in 10/20      Started B12 supplements, parenteral x 3months then changed to oral in 1/21      Monitor         Relevant Orders    Iron and TIBC    Vitamin B12    Ferritin    Vitamin D deficiency    Overview   Goal 40-50      On supplement      Monitor         Relevant Orders    Vitamin D 25-Hydroxy,Total (for eval of Vitamin D levels)    Iron and TIBC    Ferritin     Other Visit Diagnoses         Cervical pain (neck)        she sees SPine MD at [facility]  MRI ordered given she is getting MRI lumbar    Relevant Orders    MR cervical spine wo IV contrast      Routine adult health maintenance        Relevant Medications    pneumoc 20-beulah conj-dip cr,PF, (Prevnar) 0.5 mL vaccine            ROS otherwise negative aside from what was mentioned above in HPI.    Vitals  /62   Pulse 72   Temp 35.8 °C (96.5 °F)   Ht 1.727 m (5' 8\")   Wt 58.1 kg (128 lb)   SpO2 96%   BMI 19.46 kg/m²   Body mass index is 19.46 kg/m².  Physical Exam  Gen: Alert, NAD  HEENT:  Normal " exam  Neck:  Pain with palpation back of the neck., decreased ROM laterally, right worse  No masses/nodes palpable; Thyroid nontender and without nodules; No ELINOR  Respiratory:  Lungs CTAB  CV: RRR  Neuro:  Gross motor and sensory intact;  + SLR on right  Skin:  No suspicious lesions present  Breast: No masses or axillary lymphadenopathy  Gyn: Normal pelvic exam: no uterine masses or cervical lesions, or CMT; no vaginal D/C. No ovarian or adnexal masses; No external vaginal or anal/perineal lesions (Pt declined chaperone)      Allergies and Medications  Albuterol, Amitriptyline, Amoxicillin, and Levofloxacin  Current Outpatient Medications   Medication Instructions    acebutolol (SECTRAL) 200 mg, oral, As needed, Takes only when having rapid heart beat per cardiology    acetaminophen (TYLENOL) 500 mg, 4 times daily    amLODIPine (NORVASC) 5 mg, oral, 2 times daily    ascorbic acid (Vitamin C) 500 mg chewable tablet 1 tablet, 2 times daily    cholecalciferol (Vitamin D-3) 25 MCG (1000 UT) tablet 1 tablet, Daily    clindamycin (CLEOCIN) 600 mg, Once PRN Procedure    gabapentin (NEURONTIN) 300 mg, oral, Nightly    ibuprofen (AdviL) 200 mg tablet 2 tablets, Every 6 hours PRN    levothyroxine (SYNTHROID, LEVOXYL) 137 mcg, oral, Daily    pneumoc 20-beulah conj-dip cr,PF, (Prevnar) 0.5 mL vaccine 0.5 mL, intramuscular, Once    sertraline (ZOLOFT) 25 mg, oral, Daily       Active Problem List  Problem List[1]    Comprehensive Medical/Surgical/Social/Family History  Medical History[2]  Surgical History[3]    Social History     Social History Narrative    Family History:        F:  Lung CA, OA, AFIB ( age 68)        M; HTN, DJD/OA, Hyperlipidemia, colon polyps, stage 4 lung CA ( age 74)           B:  Lipids                                                           B:  Lipids , HTN                                                                                B: CAD/ MI age 53, HTN        B: CAD/CABG                                     B: CAD, HTN        Social History:     · , 2 kids       Retired from [employer]     · No illicit drug use     · Non-smoker      ·Occ ETOH          [1]   Patient Active Problem List  Diagnosis    Allergic rhinitis    History of anemia    Chronic insomnia    Grade I hemorrhoids    History of thyroid cancer    Hypothyroidism, adult    Lumbar disc disease    Paroxysmal supraventricular tachycardia    Vitamin B12 deficiency    Vitamin D deficiency    Chronic cough    Hypertension, essential, benign    Routine health maintenance    Primary osteoarthritis involving multiple joints    History of Helicobacter pylori infection    Anxiety about health    Cholangiocarcinoma (Multi)    History of pleural effusion    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    H/O bone density study     11/18: normal     2/8/21: normal    H/O chest x-ray     2013(-)     11/21: normal    H/O chest x-ray 12/15/2023    normal    H/O chest x-ray 04/10/2024    Small bilateral pleural effusions    H/O chest x-ray 05/22/2024    There is interval improvement of mild patchy opacities in the left lower lung.  Decrease in size of left-sided pleural effusion is also noted.  There is similar blunting of the right lateral costophrenic angle.    H/O chest x-ray 07/25/2024    Minimal persistent blunting of the costophrenic angles.  No developing abnormality    H/O chest x-ray 10/16/2024    Moderately large right pleural effusion    H/O chest  x-ray 11/14/2024    Persistent right pleural effusion.  Persistent airspace disease in the right lung.  Minimal improvement    H/O CT scan     CT Calcium score 09/18:  NORMAL    H/O CT scan of abdomen 11/03/2023    5.7 CM LOBULAR ENHANCING MASS IN THE CENTRAL ASPECT OF THE LIVER WITH SUSPECTED VASCULAR INVOLVEMENT AND ABDOMINAL LYMPHADENOPATHY.  PRIMARY HEPATIC LESION CONSIDERED MOST LIKELY WITH LEADING DIFFERENTIAL CONSIDERATION OF CHOLANGIOCARCINOMA.  SUBTLE HAZY MESENTERIC DENSITY IN THE PERIPHERY OF THE ABDOMEN SUGGESTIVE OF DEVELOPING CARCINOMATOSIS    H/O CT scan of abdomen 01/12/2024    Postoperative changes of extended left hepatectomy with increased size of postoperative collection along the resection margin.  No evidence of residual tumor.  Stable heterogeneous enhancement in the inferior RIGHT lobe, probably due to poor venous drainage related to the accessory RIGHT hepatic vein visualized on preoperative imaging and either occluded or thrombosed on the current imaging.    H/O CT scan of abdomen 05/20/2024    1.  No new mass or lymphadenopathy  2.  Decreased size of a perihepatic fluid collection  3.  Decreased ascites  4.  Decreased size of a right pleural effusion, Subcentimeter lesions that are too small to characterize but  likely benign.  Left peripelvic cyst    H/O CT scan of abdomen 11/12/2024    Postoperative changes of the liver with small cystic structures.  No lymphadenopathy seen.  Inflammation with wall thickening of the ascending and proximal transverse colon new since the previous exam.  Small amounts of loculated peritoneal fluid inferior to the right lobe the liver and in the right pelvis    H/O CT scan of abdomen 05/2025    1.  No new mass or lymphadenopathy in the abdomen and pelvis  2.  Decreased fluid along the right lobe of the liver  3.  Resolution of previously described thickening of the wall the colon    H/O CT scan of brain     8/20: normal    H/O CT scan of chest     12/1/22:  1.  Minimal atelectasis is present in the right middle lobe and in     the lingula. There is no abnormality noted to explain the patient's     dyspnea    H/O CT scan of chest 11/29/2023    normal    H/O CT scan of chest 10/23/2024    Moderate to large right-sided multiloculated pleural effusion, stable in size compared to the most recent chest radiograph from 10/15/2022, substantially larger compared to the chest radiograph dated 07/24/2024.   Small locules of gas within the right pleural fluid are consistent with a hydropneumothorax.  A right Pleurx catheter remains in place. Nonspecific smooth right-sided pleural thickening    H/O CT scan of chest     (cont) New dependent densities in the right middle and right lower lobe adjacent to the pleural effusion since the prior abdominal CT from 05/16/2024 likely represent atelectasis.  3.  A now borderline enlarged right paratracheal lymph node is larger compared to the prior chest CT from 02/14/2024.  4.  Stable clustered pulmonary nodules in the lingula since the prior chest CT dated 02/14/2024    H/O Doppler ultrasound     12/21: KOBE (-)    H/O echocardiogram     11/21: Summary:     1. The resting ejection fraction was estimated at 55% with a peak exercise ejection     fraction estimated at 65 to 70%.     2. Peak HR= 148bpm which is 93% of APMHR.     3. Peak BP= 198/84; hypertensive response to exercise.     4. METS acheived= 7.     5. Average exercise capacity.     6. RVSP higher post-exercise.     7. No electrocardiographic or echocardiographic evidence    H/O magnetic resonance imaging 11/29/2023    MRI abd: Central hepatic mass, presumably a cholangiocarcinoma.  This has vascular  and biliary abutment    History of Holter monitoring     2013: PVCs, SVT runs (AT per cardiology)      Sinus rhythm / tachycardia.      Maximum heart rate was 130 bpm at 06:37.      Minimum heart rate was 64 bpm at 09:35.      The average heart rate was 70 bpm.      Rare ventricular  ectopic singles.      Rare supraventricular ectopic singles, couplets,      bigeminy, and runs. Th fastest run 4 beats 156 bpm      at13:08 and longest run 13 beats 136 bpm    History of MRI of lumbar spine     8/20: Degenerative grade 1 retrolisthesis of L5 over S1 with exaggeration of      the overall lumbar lordosis.           Incidental S2/3 level Tarlov cysts.           Degenerative disc disease centered at the L5/S1 level with considerable      loss of disc height with mild endplate changes with additional multilevel      minimal endplate changes. No concerning marrow changes.      L3/4: Mild facet    History of ultrasound, pelvic     09/2018 unremarkable pelvic ultrasound   [3]   Past Surgical History:  Procedure Laterality Date    BREAST BIOPSY      12/18: Usual ductal hyperplasia and apocrine      metaplasia in a fibroelastotic stroma, suggestive of radial scar.    CHOLECYSTECTOMY  08/19/2018    Cholecystectomy    COLONOSCOPY      2015: Colonoscopy - normal - repeat in 7yrs.; Internal hemorrhoids     11/22: Diverticulosis and Hemorrhoids    COLONOSCOPY  01/07/2025    Tortuous colon.                       - Internal hemorrhoids.    DILATION AND CURETTAGE OF UTERUS      Dilation & curettage; video hysteroscopy and endometrial ablation with Novasure    ESOPHAGOGASTRODUODENOSCOPY  11/22/2022 11/22: erythematous mucosa in stomach, duodenal polyp     bx: HP    EXPLORATORY LAPAROTOMY      diagnostic laparoscopy converted to exploratory laparotomy, extended left hepatectomy, intraoperative ultrasound, and hilar lympadenectomy (12/15/2023)    FINGER SURGERY      2/12/2019 [facility] Dr. Tse. bone, trapezium, left, excision - degenerative joint disease     and focal osteonecrosis.  L thumb ligament reconstruction and tendon interposition     arthroplasty    HAND SURGERY      left [facility] joint replacement    HERNIA REPAIR  02/2025    1. Open right myofascial advancement flap. 2. Open left myofascial advancement flap. 3.  Repair of incarcerated incisional hernia.  4. Implantation of 30x30 cm of Prolene mesh.  5. Resection of skin and subcutaneous tissue.    JOINT REPLACEMENT  knee 5/23    LARYNGOSCOPY  08/19/2018    Direct Laryngoscopy    LIVER BIOPSY  11/29/2023    Adenocarcinoma involving liver parenchyma. . The overall immunophenotype would support a metastatic adenocarcinoma of pancreaticobiliary origin or a primary intrahepatic cholangiocarcinoma    LUNG BIOPSY  10/31/2024    Surgical Pathology: A - Pleural Cavity, Right, Fluid, Thoracentesis  Negative for malignant cells.  FINAL DIAGNOSIS  A. Lung, right chest pleura, biopsy: - Acute fibrinous and chronic fibrous pleuritis (see comment). - No tumor is seen.  B. Lung, right pleura, biopsy: - Acute fibrinous and chronic fibrous pleuritis (see comment). - No tumor is seen.    LUNG DECORTICATION  02/11/2024    s/p IR placement of R pleurx catheter 2/11/24.    LUNG DECORTICATION  11/2024    uncomplicated R VATS decortication and removal of a pleurX catheter for a fibrothorax    THORACENTESIS  12/31/2023    PLEURAL FLUID RIGHT             Negative for malignant cells.           Acute and chronic inflammation and reactive mesothelial cells.    THYROIDECTOMY, PARTIAL      mutifocal micropapillary thyroid cancer. Left. Keep TSH low (no need for complete     suppression though)

## 2025-07-01 ENCOUNTER — TELEPHONE (OUTPATIENT)
Dept: PRIMARY CARE | Facility: CLINIC | Age: 65
End: 2025-07-01
Payer: COMMERCIAL

## 2025-07-01 NOTE — TELEPHONE ENCOUNTER
Left detailed message relaying she needs to be seen by NP. No medication can be given unless seen and not sure it is Shingles.  Please call patient and schedule with NP

## 2025-07-02 ENCOUNTER — OFFICE VISIT (OUTPATIENT)
Dept: PRIMARY CARE | Facility: CLINIC | Age: 65
End: 2025-07-02
Payer: COMMERCIAL

## 2025-07-02 VITALS
WEIGHT: 130 LBS | HEIGHT: 68 IN | OXYGEN SATURATION: 98 % | BODY MASS INDEX: 19.7 KG/M2 | DIASTOLIC BLOOD PRESSURE: 80 MMHG | TEMPERATURE: 97.8 F | SYSTOLIC BLOOD PRESSURE: 127 MMHG | HEART RATE: 71 BPM

## 2025-07-02 DIAGNOSIS — L25.5 DERMATITIS DUE TO PLANT: Primary | ICD-10-CM

## 2025-07-02 PROCEDURE — 1036F TOBACCO NON-USER: CPT | Performed by: NURSE PRACTITIONER

## 2025-07-02 PROCEDURE — 99213 OFFICE O/P EST LOW 20 MIN: CPT | Performed by: NURSE PRACTITIONER

## 2025-07-02 PROCEDURE — 3074F SYST BP LT 130 MM HG: CPT | Performed by: NURSE PRACTITIONER

## 2025-07-02 PROCEDURE — 3079F DIAST BP 80-89 MM HG: CPT | Performed by: NURSE PRACTITIONER

## 2025-07-02 PROCEDURE — 3008F BODY MASS INDEX DOCD: CPT | Performed by: NURSE PRACTITIONER

## 2025-07-02 RX ORDER — PREDNISONE 10 MG/1
TABLET ORAL
Qty: 30 TABLET | Refills: 0 | Status: SHIPPED | OUTPATIENT
Start: 2025-07-02

## 2025-07-02 RX ORDER — CLOBETASOL PROPIONATE 0.5 MG/G
CREAM TOPICAL
Qty: 15 G | Refills: 0 | Status: SHIPPED | OUTPATIENT
Start: 2025-07-02

## 2025-07-02 ASSESSMENT — PAIN SCALES - GENERAL: PAINLEVEL_OUTOF10: 6

## 2025-07-02 NOTE — PROGRESS NOTES
"Subjective   Patient ID: Isabel Burroughs is a 64 y.o. female who presents for Rash.    Left hip   Spreading   itchy, slight painful   Also a spot on finger  Maybe something appearing behind right knee  Sx's started few days ago   No new soaps/ detergents/ med's    has poison ivy, small spot          Review of Systems  ROS completely negative except what was mentioned in the HPI.  Problem List, surgical, social, and family histories which were reviewed and updated as necessary within the EMR. I also personally reviewed the notes, labs, and imaging that pertained to what was documented or discussed in the HPI.    Objective   Physical Exam  Vitals and nursing note reviewed.   Constitutional:       Appearance: Normal appearance.   HENT:      Head: Normocephalic and atraumatic.      Right Ear: External ear normal.      Left Ear: External ear normal.      Nose: Nose normal.      Mouth/Throat:      Mouth: Mucous membranes are moist.      Pharynx: Oropharynx is clear.   Eyes:      Extraocular Movements: Extraocular movements intact.      Conjunctiva/sclera: Conjunctivae normal.      Pupils: Pupils are equal, round, and reactive to light.   Pulmonary:      Effort: Pulmonary effort is normal.      Comments: Speaking in complete sentences  Musculoskeletal:         General: Normal range of motion.      Cervical back: Normal range of motion and neck supple.   Skin:     Comments: Erythematous rash on left hip down buttock and suprapubic abdomen with trailing and tiny pustules.     Neurological:      General: No focal deficit present.      Mental Status: She is alert and oriented to person, place, and time. Mental status is at baseline.   Psychiatric:         Mood and Affect: Mood normal.         Behavior: Behavior normal.         Thought Content: Thought content normal.         Judgment: Judgment normal.         /80   Pulse 71   Temp 36.6 °C (97.8 °F) (Temporal)   Ht 1.727 m (5' 8\")   Wt 59 kg (130 lb)   SpO2 " 98%   BMI 19.77 kg/m²     Assessment/Plan    Problem List Items Addressed This Visit    None  Visit Diagnoses         Dermatitis due to plant    -  Primary    Relevant Medications    predniSONE (Deltasone) 10 mg tablet    clobetasol (Temovate) 0.05 % cream

## 2025-07-02 NOTE — TELEPHONE ENCOUNTER
Patient stated she has a rash - left hip area, few days, spreading, itchy, slightly painful.    Patient scheduled with NP

## 2025-08-11 ENCOUNTER — TELEPHONE (OUTPATIENT)
Dept: PRIMARY CARE | Facility: CLINIC | Age: 65
End: 2025-08-11
Payer: COMMERCIAL

## 2025-08-12 ENCOUNTER — OFFICE VISIT (OUTPATIENT)
Dept: URGENT CARE | Age: 65
End: 2025-08-12
Payer: COMMERCIAL

## 2025-08-12 ENCOUNTER — TELEPHONE (OUTPATIENT)
Dept: PRIMARY CARE | Facility: CLINIC | Age: 65
End: 2025-08-12
Payer: COMMERCIAL

## 2025-08-12 VITALS
RESPIRATION RATE: 18 BRPM | HEIGHT: 68 IN | DIASTOLIC BLOOD PRESSURE: 81 MMHG | HEART RATE: 73 BPM | BODY MASS INDEX: 19.4 KG/M2 | WEIGHT: 128 LBS | SYSTOLIC BLOOD PRESSURE: 132 MMHG | OXYGEN SATURATION: 99 %

## 2025-08-12 DIAGNOSIS — M47.816 LUMBAR SPONDYLOSIS: ICD-10-CM

## 2025-08-12 DIAGNOSIS — L23.7 POISON IVY DERMATITIS: ICD-10-CM

## 2025-08-12 DIAGNOSIS — L25.9 CONTACT DERMATITIS, UNSPECIFIED CONTACT DERMATITIS TYPE, UNSPECIFIED TRIGGER: ICD-10-CM

## 2025-08-12 DIAGNOSIS — H10.022 MUCOPURULENT CONJUNCTIVITIS OF LEFT EYE: Primary | ICD-10-CM

## 2025-08-12 PROCEDURE — 3075F SYST BP GE 130 - 139MM HG: CPT | Performed by: STUDENT IN AN ORGANIZED HEALTH CARE EDUCATION/TRAINING PROGRAM

## 2025-08-12 PROCEDURE — 99213 OFFICE O/P EST LOW 20 MIN: CPT | Performed by: STUDENT IN AN ORGANIZED HEALTH CARE EDUCATION/TRAINING PROGRAM

## 2025-08-12 PROCEDURE — 1036F TOBACCO NON-USER: CPT | Performed by: STUDENT IN AN ORGANIZED HEALTH CARE EDUCATION/TRAINING PROGRAM

## 2025-08-12 PROCEDURE — 3008F BODY MASS INDEX DOCD: CPT | Performed by: STUDENT IN AN ORGANIZED HEALTH CARE EDUCATION/TRAINING PROGRAM

## 2025-08-12 PROCEDURE — 3079F DIAST BP 80-89 MM HG: CPT | Performed by: STUDENT IN AN ORGANIZED HEALTH CARE EDUCATION/TRAINING PROGRAM

## 2025-08-12 RX ORDER — METHYLPREDNISOLONE 4 MG/1
TABLET ORAL
Qty: 21 TABLET | Refills: 0 | Status: SHIPPED | OUTPATIENT
Start: 2025-08-12 | End: 2025-08-18

## 2025-08-12 RX ORDER — GABAPENTIN 100 MG/1
100 CAPSULE ORAL NIGHTLY
Qty: 90 CAPSULE | Refills: 3 | Status: SHIPPED | OUTPATIENT
Start: 2025-08-12

## 2025-08-12 RX ORDER — POLYMYXIN B SULFATE AND TRIMETHOPRIM 1; 10000 MG/ML; [USP'U]/ML
1 SOLUTION OPHTHALMIC EVERY 6 HOURS
Qty: 10 ML | Refills: 0 | Status: SHIPPED | OUTPATIENT
Start: 2025-08-12 | End: 2025-08-17

## 2025-08-27 ENCOUNTER — PATIENT MESSAGE (OUTPATIENT)
Dept: PRIMARY CARE | Facility: CLINIC | Age: 65
End: 2025-08-27
Payer: COMMERCIAL

## 2025-09-03 ENCOUNTER — TELEPHONE (OUTPATIENT)
Dept: PRIMARY CARE | Facility: CLINIC | Age: 65
End: 2025-09-03

## 2025-09-03 ENCOUNTER — TELEMEDICINE (OUTPATIENT)
Dept: PRIMARY CARE | Facility: CLINIC | Age: 65
End: 2025-09-03
Payer: COMMERCIAL

## 2025-09-03 VITALS
WEIGHT: 128 LBS | BODY MASS INDEX: 19.4 KG/M2 | HEIGHT: 68 IN | SYSTOLIC BLOOD PRESSURE: 138 MMHG | DIASTOLIC BLOOD PRESSURE: 80 MMHG

## 2025-09-03 DIAGNOSIS — R35.0 URINARY FREQUENCY: Primary | ICD-10-CM

## 2025-09-03 DIAGNOSIS — I10 HYPERTENSION, ESSENTIAL, BENIGN: ICD-10-CM

## 2025-09-03 PROCEDURE — 3075F SYST BP GE 130 - 139MM HG: CPT | Performed by: NURSE PRACTITIONER

## 2025-09-03 PROCEDURE — 3079F DIAST BP 80-89 MM HG: CPT | Performed by: NURSE PRACTITIONER

## 2025-09-03 PROCEDURE — 99214 OFFICE O/P EST MOD 30 MIN: CPT | Performed by: NURSE PRACTITIONER

## 2025-09-03 PROCEDURE — 1036F TOBACCO NON-USER: CPT | Performed by: NURSE PRACTITIONER

## 2025-09-03 PROCEDURE — 3008F BODY MASS INDEX DOCD: CPT | Performed by: NURSE PRACTITIONER

## 2025-09-03 RX ORDER — CETIRIZINE HYDROCHLORIDE 10 MG/1
10 TABLET ORAL NIGHTLY
COMMUNITY
Start: 2025-08-13 | End: 2025-09-03 | Stop reason: ALTCHOICE

## 2025-09-03 RX ORDER — CLINDAMYCIN HYDROCHLORIDE 150 MG/1
1 CAPSULE ORAL EVERY 6 HOURS
COMMUNITY
Start: 2025-08-26

## 2025-09-05 LAB
APPEARANCE UR: CLEAR
BACTERIA #/AREA URNS HPF: ABNORMAL /HPF
BACTERIA UR CULT: ABNORMAL
BACTERIA UR CULT: ABNORMAL
BILIRUB UR QL STRIP: NEGATIVE
CAOX CRY #/AREA URNS HPF: ABNORMAL /HPF
COLOR UR: YELLOW
GLUCOSE UR QL STRIP: NEGATIVE
HGB UR QL STRIP: NEGATIVE
HYALINE CASTS #/AREA URNS LPF: ABNORMAL /LPF
KETONES UR QL STRIP: ABNORMAL
LEUKOCYTE ESTERASE UR QL STRIP: ABNORMAL
NITRITE UR QL STRIP: NEGATIVE
PH UR STRIP: ABNORMAL [PH] (ref 5–8)
PROT UR QL STRIP: ABNORMAL
RBC #/AREA URNS HPF: ABNORMAL /HPF
SERVICE CMNT-IMP: ABNORMAL
SP GR UR STRIP: 1.02 (ref 1–1.03)
SQUAMOUS #/AREA URNS HPF: ABNORMAL /HPF
WBC #/AREA URNS HPF: ABNORMAL /HPF

## 2025-09-10 ENCOUNTER — APPOINTMENT (OUTPATIENT)
Dept: PRIMARY CARE | Facility: CLINIC | Age: 65
End: 2025-09-10
Payer: COMMERCIAL

## 2026-06-29 ENCOUNTER — APPOINTMENT (OUTPATIENT)
Dept: PRIMARY CARE | Facility: CLINIC | Age: 66
End: 2026-06-29
Payer: COMMERCIAL